# Patient Record
Sex: MALE | Race: WHITE | NOT HISPANIC OR LATINO | Employment: OTHER | URBAN - NONMETROPOLITAN AREA
[De-identification: names, ages, dates, MRNs, and addresses within clinical notes are randomized per-mention and may not be internally consistent; named-entity substitution may affect disease eponyms.]

---

## 2018-01-24 ENCOUNTER — OFFICE VISIT CONVERTED (OUTPATIENT)
Dept: OTOLARYNGOLOGY | Facility: CLINIC | Age: 68
End: 2018-01-24
Attending: OTOLARYNGOLOGY

## 2018-01-24 ENCOUNTER — CONVERSION ENCOUNTER (OUTPATIENT)
Dept: OTOLARYNGOLOGY | Facility: CLINIC | Age: 68
End: 2018-01-24

## 2018-04-18 ENCOUNTER — OFFICE VISIT CONVERTED (OUTPATIENT)
Dept: OTOLARYNGOLOGY | Facility: CLINIC | Age: 68
End: 2018-04-18
Attending: OTOLARYNGOLOGY

## 2021-04-22 ENCOUNTER — HOSPITAL ENCOUNTER (OUTPATIENT)
Dept: OTHER | Facility: HOSPITAL | Age: 71
Discharge: HOME | End: 2021-04-22
Attending: FAMILY MEDICINE

## 2021-04-22 LAB
ALBUMIN SERPL-MCNC: 4.1 G/DL (ref 3.5–5)
ALBUMIN/GLOB SERPL: 1.5 {RATIO} (ref 1.4–2.6)
ALP SERPL-CCNC: 68 U/L (ref 56–155)
ALT SERPL-CCNC: 20 U/L (ref 10–40)
ANION GAP SERPL CALC-SCNC: 17 MMOL/L (ref 8–19)
AST SERPL-CCNC: 19 U/L (ref 15–50)
BASOPHILS # BLD MANUAL: 0.05 10*3/UL (ref 0–0.2)
BASOPHILS NFR BLD MANUAL: 0.9 % (ref 0–3)
BILIRUB SERPL-MCNC: 0.57 MG/DL (ref 0.2–1.3)
BUN SERPL-MCNC: 15 MG/DL (ref 5–25)
BUN/CREAT SERPL: 13 {RATIO} (ref 6–20)
CALCIUM SERPL-MCNC: 9.3 MG/DL (ref 8.7–10.4)
CHLORIDE SERPL-SCNC: 101 MMOL/L (ref 99–111)
CHOLEST SERPL-MCNC: 162 MG/DL (ref 107–200)
CHOLEST/HDLC SERPL: 4.9 {RATIO} (ref 3–6)
CONV CO2: 26 MMOL/L (ref 22–32)
CONV CREATININE URINE, RANDOM: 252.6 MG/DL (ref 10–300)
CONV MICROALBUM.,U,RANDOM: <12 MG/L (ref 0–20)
CONV TOTAL PROTEIN: 6.9 G/DL (ref 6.3–8.2)
CREAT UR-MCNC: 1.15 MG/DL (ref 0.7–1.2)
DEPRECATED RDW RBC AUTO: 43.9 FL
EOSINOPHIL # BLD MANUAL: 0.44 10*3/UL (ref 0–0.7)
EOSINOPHIL NFR BLD MANUAL: 7.7 % (ref 0–7)
ERYTHROCYTE [DISTWIDTH] IN BLOOD BY AUTOMATED COUNT: 12.9 % (ref 11.5–14.5)
EST. AVERAGE GLUCOSE BLD GHB EST-MCNC: 148 MG/DL
GFR SERPLBLD BASED ON 1.73 SQ M-ARVRAT: >60 ML/MIN/{1.73_M2}
GLOBULIN UR ELPH-MCNC: 2.8 G/DL (ref 2–3.5)
GLUCOSE SERPL-MCNC: 174 MG/DL (ref 70–99)
GRANS (ABSOLUTE): 3.45 10*3/UL (ref 2–8)
GRANS: 60.1 % (ref 30–85)
HBA1C MFR BLD: 14.5 G/DL (ref 14–18)
HBA1C MFR BLD: 6.8 % (ref 3.5–5.7)
HCT VFR BLD AUTO: 43 % (ref 42–52)
HDLC SERPL-MCNC: 33 MG/DL (ref 40–60)
IMM GRANULOCYTES # BLD: 0.01 10*3/UL (ref 0–0.54)
IMM GRANULOCYTES NFR BLD: 0.2 % (ref 0–0.43)
LDLC SERPL CALC-MCNC: 89 MG/DL (ref 70–100)
LYMPHOCYTES # BLD MANUAL: 1.46 10*3/UL (ref 1–5)
LYMPHOCYTES NFR BLD MANUAL: 5.6 % (ref 3–10)
MCH RBC QN AUTO: 31 PG (ref 27–31)
MCHC RBC AUTO-ENTMCNC: 33.7 G/DL (ref 33–37)
MCV RBC AUTO: 92.1 FL (ref 80–96)
MICROALBUMIN/CREAT UR: 4.8 MG/G{CRE} (ref 0–25)
MONOCYTES # BLD AUTO: 0.32 10*3/UL (ref 0.2–1.2)
OSMOLALITY SERPL CALC.SUM OF ELEC: 295 MOSM/KG (ref 273–304)
PLATELET # BLD AUTO: 218 10*3/UL (ref 130–400)
PMV BLD AUTO: 10.3 FL (ref 7.4–10.4)
POTASSIUM SERPL-SCNC: 4.2 MMOL/L (ref 3.5–5.3)
PSA SERPL-MCNC: 0.78 NG/ML (ref 0–4)
RBC # BLD AUTO: 4.67 10*6/UL (ref 4.7–6.1)
SODIUM SERPL-SCNC: 140 MMOL/L (ref 135–147)
TRIGL SERPL-MCNC: 200 MG/DL (ref 40–150)
TSH SERPL-ACNC: 1.48 M[IU]/L (ref 0.27–4.2)
VARIANT LYMPHS NFR BLD MANUAL: 25.5 % (ref 20–45)
VLDLC SERPL-MCNC: 40 MG/DL (ref 5–37)
WBC # BLD AUTO: 5.73 10*3/UL (ref 4.8–10.8)

## 2021-05-16 VITALS — TEMPERATURE: 97 F | BODY MASS INDEX: 28.79 KG/M2 | HEIGHT: 68 IN | WEIGHT: 190 LBS

## 2021-05-16 VITALS — BODY MASS INDEX: 29.38 KG/M2 | HEIGHT: 69 IN | TEMPERATURE: 97 F | WEIGHT: 198.37 LBS

## 2021-09-21 ENCOUNTER — TRANSCRIBE ORDERS (OUTPATIENT)
Dept: ADMINISTRATIVE | Facility: HOSPITAL | Age: 71
End: 2021-09-21

## 2021-09-21 ENCOUNTER — LAB (OUTPATIENT)
Dept: LAB | Facility: HOSPITAL | Age: 71
End: 2021-09-21

## 2021-09-21 DIAGNOSIS — Z79.899 ENCOUNTER FOR LONG-TERM (CURRENT) USE OF OTHER MEDICATIONS: ICD-10-CM

## 2021-09-21 DIAGNOSIS — E11.9 DIABETES MELLITUS WITHOUT COMPLICATION (HCC): ICD-10-CM

## 2021-09-21 DIAGNOSIS — E11.9 DIABETES MELLITUS WITHOUT COMPLICATION (HCC): Primary | ICD-10-CM

## 2021-09-21 LAB — HBA1C MFR BLD: 6.8 % (ref 4.8–5.6)

## 2021-09-21 PROCEDURE — 36415 COLL VENOUS BLD VENIPUNCTURE: CPT

## 2021-09-21 PROCEDURE — 83036 HEMOGLOBIN GLYCOSYLATED A1C: CPT

## 2022-05-04 ENCOUNTER — TRANSCRIBE ORDERS (OUTPATIENT)
Dept: ADMINISTRATIVE | Facility: HOSPITAL | Age: 72
End: 2022-05-04

## 2022-05-04 ENCOUNTER — LAB (OUTPATIENT)
Dept: LAB | Facility: HOSPITAL | Age: 72
End: 2022-05-04

## 2022-05-04 DIAGNOSIS — K21.9 GERD WITHOUT ESOPHAGITIS: ICD-10-CM

## 2022-05-04 DIAGNOSIS — E55.9 VITAMIN D DEFICIENCY: ICD-10-CM

## 2022-05-04 DIAGNOSIS — Z79.899 ENCOUNTER FOR LONG-TERM (CURRENT) USE OF OTHER MEDICATIONS: ICD-10-CM

## 2022-05-04 DIAGNOSIS — R53.83 TIREDNESS: ICD-10-CM

## 2022-05-04 DIAGNOSIS — E11.9 DIABETES MELLITUS WITHOUT COMPLICATION: ICD-10-CM

## 2022-05-04 DIAGNOSIS — E78.5 HYPERLIPIDEMIA, UNSPECIFIED HYPERLIPIDEMIA TYPE: ICD-10-CM

## 2022-05-04 DIAGNOSIS — E78.5 HYPERLIPIDEMIA, UNSPECIFIED HYPERLIPIDEMIA TYPE: Primary | ICD-10-CM

## 2022-05-04 LAB
25(OH)D3 SERPL-MCNC: 71.6 NG/ML (ref 30–100)
ALBUMIN SERPL-MCNC: 4.5 G/DL (ref 3.5–5.2)
ALBUMIN/GLOB SERPL: 1.7 G/DL
ALP SERPL-CCNC: 80 U/L (ref 39–117)
ALT SERPL W P-5'-P-CCNC: 17 U/L (ref 1–41)
ANION GAP SERPL CALCULATED.3IONS-SCNC: 8.2 MMOL/L (ref 5–15)
AST SERPL-CCNC: 19 U/L (ref 1–40)
BASOPHILS # BLD AUTO: 0.05 10*3/MM3 (ref 0–0.2)
BASOPHILS NFR BLD AUTO: 0.8 % (ref 0–1.5)
BILIRUB SERPL-MCNC: 0.4 MG/DL (ref 0–1.2)
BUN SERPL-MCNC: 11 MG/DL (ref 8–23)
BUN/CREAT SERPL: 9.6 (ref 7–25)
CALCIUM SPEC-SCNC: 9.6 MG/DL (ref 8.6–10.5)
CHLORIDE SERPL-SCNC: 103 MMOL/L (ref 98–107)
CHOLEST SERPL-MCNC: 179 MG/DL (ref 0–200)
CO2 SERPL-SCNC: 28.8 MMOL/L (ref 22–29)
CREAT SERPL-MCNC: 1.14 MG/DL (ref 0.76–1.27)
DEPRECATED RDW RBC AUTO: 43.7 FL (ref 37–54)
EGFRCR SERPLBLD CKD-EPI 2021: 68.8 ML/MIN/1.73
EOSINOPHIL # BLD AUTO: 0.43 10*3/MM3 (ref 0–0.4)
EOSINOPHIL NFR BLD AUTO: 6.9 % (ref 0.3–6.2)
ERYTHROCYTE [DISTWIDTH] IN BLOOD BY AUTOMATED COUNT: 12.7 % (ref 12.3–15.4)
FOLATE SERPL-MCNC: 20 NG/ML (ref 4.78–24.2)
GLOBULIN UR ELPH-MCNC: 2.6 GM/DL
GLUCOSE SERPL-MCNC: 141 MG/DL (ref 65–99)
HBA1C MFR BLD: 6.8 % (ref 4.8–5.6)
HCT VFR BLD AUTO: 43.9 % (ref 37.5–51)
HDLC SERPL-MCNC: 31 MG/DL (ref 40–60)
HGB BLD-MCNC: 14.5 G/DL (ref 13–17.7)
IMM GRANULOCYTES # BLD AUTO: 0.01 10*3/MM3 (ref 0–0.05)
IMM GRANULOCYTES NFR BLD AUTO: 0.2 % (ref 0–0.5)
LDLC SERPL CALC-MCNC: 109 MG/DL (ref 0–100)
LDLC/HDLC SERPL: 3.34 {RATIO}
LYMPHOCYTES # BLD AUTO: 1.57 10*3/MM3 (ref 0.7–3.1)
LYMPHOCYTES NFR BLD AUTO: 25.2 % (ref 19.6–45.3)
MCH RBC QN AUTO: 30.7 PG (ref 26.6–33)
MCHC RBC AUTO-ENTMCNC: 33 G/DL (ref 31.5–35.7)
MCV RBC AUTO: 93 FL (ref 79–97)
MONOCYTES # BLD AUTO: 0.42 10*3/MM3 (ref 0.1–0.9)
MONOCYTES NFR BLD AUTO: 6.8 % (ref 5–12)
NEUTROPHILS NFR BLD AUTO: 3.74 10*3/MM3 (ref 1.7–7)
NEUTROPHILS NFR BLD AUTO: 60.1 % (ref 42.7–76)
PLATELET # BLD AUTO: 228 10*3/MM3 (ref 140–450)
PMV BLD AUTO: 10.4 FL (ref 6–12)
POTASSIUM SERPL-SCNC: 4.7 MMOL/L (ref 3.5–5.2)
PROT SERPL-MCNC: 7.1 G/DL (ref 6–8.5)
RBC # BLD AUTO: 4.72 10*6/MM3 (ref 4.14–5.8)
SODIUM SERPL-SCNC: 140 MMOL/L (ref 136–145)
TRIGL SERPL-MCNC: 222 MG/DL (ref 0–150)
TSH SERPL DL<=0.05 MIU/L-ACNC: 1.71 UIU/ML (ref 0.27–4.2)
VIT B12 BLD-MCNC: >2000 PG/ML (ref 211–946)
VLDLC SERPL-MCNC: 39 MG/DL (ref 5–40)
WBC NRBC COR # BLD: 6.22 10*3/MM3 (ref 3.4–10.8)

## 2022-05-04 PROCEDURE — 82607 VITAMIN B-12: CPT

## 2022-05-04 PROCEDURE — 83036 HEMOGLOBIN GLYCOSYLATED A1C: CPT

## 2022-05-04 PROCEDURE — 80061 LIPID PANEL: CPT

## 2022-05-04 PROCEDURE — 82746 ASSAY OF FOLIC ACID SERUM: CPT

## 2022-05-04 PROCEDURE — 84146 ASSAY OF PROLACTIN: CPT

## 2022-05-04 PROCEDURE — 36415 COLL VENOUS BLD VENIPUNCTURE: CPT

## 2022-05-04 PROCEDURE — 84443 ASSAY THYROID STIM HORMONE: CPT

## 2022-05-04 PROCEDURE — 80053 COMPREHEN METABOLIC PANEL: CPT

## 2022-05-04 PROCEDURE — 85025 COMPLETE CBC W/AUTO DIFF WBC: CPT

## 2022-05-04 PROCEDURE — 82306 VITAMIN D 25 HYDROXY: CPT

## 2022-05-12 LAB
MACROPROLACTIN/PROLACTIN MFR SERPL: 26 %
PROLACTIN SERPL-MCNC: 7.19 NG/ML
PROLACTIN.MONOMERIC SERPL-MCNC: 5.32 NG/ML

## 2022-05-13 ENCOUNTER — OFFICE VISIT (OUTPATIENT)
Dept: CARDIOLOGY | Facility: CLINIC | Age: 72
End: 2022-05-13

## 2022-05-13 ENCOUNTER — TRANSCRIBE ORDERS (OUTPATIENT)
Dept: CARDIOLOGY | Facility: CLINIC | Age: 72
End: 2022-05-13

## 2022-05-13 VITALS
WEIGHT: 193.4 LBS | HEART RATE: 64 BPM | DIASTOLIC BLOOD PRESSURE: 88 MMHG | OXYGEN SATURATION: 98 % | HEIGHT: 68 IN | SYSTOLIC BLOOD PRESSURE: 158 MMHG | BODY MASS INDEX: 29.31 KG/M2

## 2022-05-13 DIAGNOSIS — I10 PRIMARY HYPERTENSION: ICD-10-CM

## 2022-05-13 DIAGNOSIS — I20.0 UNSTABLE ANGINA: Primary | ICD-10-CM

## 2022-05-13 DIAGNOSIS — G47.33 OSA ON CPAP: ICD-10-CM

## 2022-05-13 DIAGNOSIS — Z01.818 OTHER SPECIFIED PRE-OPERATIVE EXAMINATION: ICD-10-CM

## 2022-05-13 DIAGNOSIS — Z13.6 SCREENING FOR ISCHEMIC HEART DISEASE: ICD-10-CM

## 2022-05-13 DIAGNOSIS — Z01.810 PRE-OPERATIVE CARDIOVASCULAR EXAMINATION: Primary | ICD-10-CM

## 2022-05-13 DIAGNOSIS — Z99.89 OSA ON CPAP: ICD-10-CM

## 2022-05-13 DIAGNOSIS — E11.69 TYPE 2 DIABETES MELLITUS WITH OTHER SPECIFIED COMPLICATION, WITHOUT LONG-TERM CURRENT USE OF INSULIN: ICD-10-CM

## 2022-05-13 PROBLEM — E11.9 DIABETES MELLITUS: Status: ACTIVE | Noted: 2022-05-13

## 2022-05-13 PROBLEM — R07.2 PRECORDIAL PAIN: Status: ACTIVE | Noted: 2022-05-13

## 2022-05-13 PROCEDURE — 99204 OFFICE O/P NEW MOD 45 MIN: CPT | Performed by: INTERNAL MEDICINE

## 2022-05-13 PROCEDURE — 93000 ELECTROCARDIOGRAM COMPLETE: CPT | Performed by: INTERNAL MEDICINE

## 2022-05-13 RX ORDER — ALPRAZOLAM 1 MG/1
0.5 TABLET ORAL
COMMUNITY

## 2022-05-13 RX ORDER — FOLIC ACID/MULTIVIT,IRON,MINER .4-18-35
1 TABLET,CHEWABLE ORAL DAILY
COMMUNITY

## 2022-05-13 RX ORDER — LISINOPRIL 10 MG/1
TABLET ORAL
COMMUNITY
Start: 2022-02-28

## 2022-05-13 RX ORDER — OMEPRAZOLE 10 MG/1
10 CAPSULE, DELAYED RELEASE ORAL DAILY
COMMUNITY
End: 2022-05-19 | Stop reason: HOSPADM

## 2022-05-13 RX ORDER — BIOTIN 10000 MCG
CAPSULE ORAL
COMMUNITY

## 2022-05-13 RX ORDER — UBIDECARENONE 100 MG
100 CAPSULE ORAL DAILY
COMMUNITY

## 2022-05-13 NOTE — H&P (VIEW-ONLY)
Subjective:     Encounter Date:05/13/2022      Patient ID: Nhan James is a 71 y.o. male.    Chief Complaint:  History of Present Illness    This is a 71-year-old with borderline hypertension, acid reflux, who presents for evaluation of chest pain.    I saw the patient in the past and 10/2015 for complaints of near syncope.  He had undergone an echocardiogram prior to that office visit that was unremarkable.  I recommended proceeding with a stress test because of symptoms as diaphoresis associated with his near-syncope.  It does not appear that the stress test was ever completed.    The patient reports that he was in his usual state of health until earlier this week.  About 4 days ago he reports he was going on a as usual walk when he developed sudden onset of midsternal chest pain that radiated across his chest to his bilateral upper arms and was associated with bilateral arm numbness.  He denies any associated nausea, vomiting, diaphoresis, or shortness of breath.  As soon as the symptoms started he turned right back around and went back to his house.  Once he got home he took a aspirin and sat down.  After resting for a few minutes the symptoms resolved.  He has had no recurrence since then.  He has not tried going on his walks again but did work on replacing his daughter's windows at her house the other day.  He reports this pretty intense work that involves building up a sweat and raising his heart rate.  He denies any symptoms with this activity.  Interestingly on the day that he had his episode of chest pain he had gone and played golf and had mowed his lawn without any significant issues.    He otherwise denies any dyspnea on exertion, palpitations, orthopnea, near-syncope or syncope, or lower extremity edema.  He takes lisinopril only as needed for systolic pressures greater than 140.  He reports he is only had to take it about 3 times over the last 6 months.    He does have a significant  family history of coronary artery disease including his father who had his first myocardial infarction at the age of 53 and his brother who had a CABG at the age of 52.    Review of Systems   Constitutional: Negative for malaise/fatigue.   HENT: Negative for hearing loss, hoarse voice, nosebleeds and sore throat.    Eyes: Negative for pain.   Cardiovascular: Positive for chest pain. Negative for claudication, cyanosis, dyspnea on exertion, irregular heartbeat, leg swelling, near-syncope, orthopnea, palpitations, paroxysmal nocturnal dyspnea and syncope.   Respiratory: Negative for shortness of breath and snoring.    Endocrine: Negative for cold intolerance, heat intolerance, polydipsia, polyphagia and polyuria.   Skin: Negative for itching and rash.   Musculoskeletal: Negative for arthritis, falls, joint pain, joint swelling, muscle cramps, muscle weakness and myalgias.   Gastrointestinal: Negative for constipation, diarrhea, dysphagia, heartburn, hematemesis, hematochezia, melena, nausea and vomiting.   Genitourinary: Negative for frequency, hematuria and hesitancy.   Neurological: Negative for excessive daytime sleepiness, dizziness, headaches, light-headedness, numbness and weakness.   Psychiatric/Behavioral: Negative for depression. The patient is not nervous/anxious.          Current Outpatient Medications:   •  ALPRAZolam (XANAX) 1 MG tablet, Take 0.5 tablets by mouth every night at bedtime., Disp: , Rfl:   •  Barberry-Oreg Grape-Goldenseal (BERBERINE COMPLEX PO), Take  by mouth., Disp: , Rfl:   •  Biotin 10 MG capsule, Take  by mouth., Disp: , Rfl:   •  coenzyme Q10 100 MG capsule, Take 100 mg by mouth Daily., Disp: , Rfl:   •  L-ARGININE PO, Take  by mouth., Disp: , Rfl:   •  lisinopril (PRINIVIL,ZESTRIL) 10 MG tablet, TAKE 1/2 TO 1 (ONE-HALF TO ONE) TABLET BY MOUTH ONCE DAILY FOR BLOOD PRESSURE GREATER THAN 140, Disp: , Rfl:   •  Magnesium Gluconate (MAGNESIUM 27 PO), Take  by mouth., Disp: , Rfl:   •   "multivitamin-iron-minerals-folic acid (CENTRUM) chewable tablet, Chew 1 tablet Daily., Disp: , Rfl:   •  NON FORMULARY, Tri-chromium, Disp: , Rfl:   •  Omega-3 Fatty Acids (OMEGA-3 2100 PO), Take  by mouth., Disp: , Rfl:   •  omeprazole (prilOSEC) 10 MG capsule, Take 10 mg by mouth Daily., Disp: , Rfl:   •  ZINC PICOLINATE PO, Take  by mouth., Disp: , Rfl:     Past Medical History:   Diagnosis Date   • History of hepatitis    • IVAN (obstructive sleep apnea)        Past Surgical History:   Procedure Laterality Date   • APPENDECTOMY     • CHOLECYSTECTOMY     • FINGER SURGERY     • TONSILLECTOMY AND ADENOIDECTOMY         Family History   Problem Relation Age of Onset   • Heart disease Mother    • Diabetes Mother    • Hypertension Father    • Heart disease Father    • Diabetes Brother    • Hypertension Brother    • Heart disease Brother    • Hypertension Brother    • Heart disease Brother    • Heart disease Brother    • Cerebral aneurysm Paternal Aunt    • Cancer Paternal Aunt    • Cancer Paternal Uncle    • Diabetes Maternal Grandmother    • Hypertension Maternal Grandfather    • Stroke Maternal Grandfather        Social History     Tobacco Use   • Smoking status: Former Smoker     Quit date:      Years since quittin.3   • Smokeless tobacco: Never Used   • Tobacco comment: caffeine use   Vaping Use   • Vaping Use: Never used   Substance Use Topics   • Alcohol use: Yes     Comment: occ   • Drug use: Never         ECG 12 Lead    Date/Time: 2022 4:11 PM  Performed by: Marilyn Gray MD  Authorized by: Marilyn Gray MD   Comparison: compared with previous ECG   Similar to previous ECG  Rhythm: sinus rhythm               Objective:     Visit Vitals  /88 (BP Location: Left arm, Patient Position: Sitting, Cuff Size: Adult)   Pulse 64   Ht 172.7 cm (68\")   Wt 87.7 kg (193 lb 6.4 oz)   SpO2 98%   BMI 29.41 kg/m²         Constitutional:       Appearance: Normal appearance. Well-developed.   HENT:      " Head: Normocephalic and atraumatic.   Neck:      Vascular: No carotid bruit or JVD.   Pulmonary:      Effort: Pulmonary effort is normal.      Breath sounds: Normal breath sounds.   Cardiovascular:      Normal rate. Regular rhythm.      No gallop.   Pulses:     Radial: 2+ bilaterally.  Edema:     Peripheral edema absent.   Abdominal:      Palpations: Abdomen is soft.   Skin:     General: Skin is warm and dry.   Neurological:      Mental Status: Alert and oriented to person, place, and time.             Assessment:          Diagnosis Plan   1. Unstable angina (HCC)  Case Request Cath Lab: Coronary angiography, Left heart cath, Left ventriculography   2. Primary hypertension  Case Request Cath Lab: Coronary angiography, Left heart cath, Left ventriculography   3. IVAN on CPAP     4. Type 2 diabetes mellitus with other specified complication, without long-term current use of insulin (HCC)  Case Request Cath Lab: Coronary angiography, Left heart cath, Left ventriculography          Plan:       1.  Unstable angina.  Episode of chest pain that occurred with exertion and resolved with rest is concerning for unstable angina.  Fortunately his EKG is unchanged today.  Fortunately he is also not had any further episodes of pain.  Primary risk factor appears to be significant family history of premature coronary artery disease.  In addition there appears to be some mild hypertension that is not currently treated.  It also appears that his most recent lipid panel earlier this month showed that his lipids were not ideally controlled with an LDL of 109, HDL of 31, and triglycerides of 222.  He also reports history of borderline diabetes.  Based on his risk factors and is highly suspicious symptoms I discussed the options of proceeding with stress test versus cardiac catheterization.  I think proceeding directly with a cardiac catheterization is indicated in light of his highly suspicious symptoms.  The patient would like to proceed  with cardiac catheterization.  I did go over the procedure, risk, benefits at length with the patient.  2.  Hypertension.  Does not take any antihypertensive medications on a regular basis.  His blood pressures are mildly elevated in the office today but he indicates that is not normally this elevated.  3.  Hyperlipidemia.  4.  Diabetes mellitus type 2  5.  Obstructive sleep apnea.    Further recommendations will be based on the results of his cardiac catheterization.

## 2022-05-17 ENCOUNTER — APPOINTMENT (OUTPATIENT)
Dept: LAB | Facility: HOSPITAL | Age: 72
End: 2022-05-17

## 2022-05-17 ENCOUNTER — LAB (OUTPATIENT)
Dept: LAB | Facility: HOSPITAL | Age: 72
End: 2022-05-17

## 2022-05-17 ENCOUNTER — TRANSCRIBE ORDERS (OUTPATIENT)
Dept: ADMINISTRATIVE | Facility: HOSPITAL | Age: 72
End: 2022-05-17

## 2022-05-17 DIAGNOSIS — Z01.810 PRE-OPERATIVE CARDIOVASCULAR EXAMINATION: ICD-10-CM

## 2022-05-17 DIAGNOSIS — Z13.6 SCREENING FOR ISCHEMIC HEART DISEASE: ICD-10-CM

## 2022-05-17 DIAGNOSIS — Z01.818 OTHER SPECIFIED PRE-OPERATIVE EXAMINATION: Primary | ICD-10-CM

## 2022-05-17 DIAGNOSIS — Z01.818 OTHER SPECIFIED PRE-OPERATIVE EXAMINATION: ICD-10-CM

## 2022-05-17 LAB
ANION GAP SERPL CALCULATED.3IONS-SCNC: 11.1 MMOL/L (ref 5–15)
BASOPHILS # BLD AUTO: 0.05 10*3/MM3 (ref 0–0.2)
BASOPHILS NFR BLD AUTO: 0.9 % (ref 0–1.5)
BUN SERPL-MCNC: 16 MG/DL (ref 8–23)
BUN/CREAT SERPL: 14.5 (ref 7–25)
CALCIUM SPEC-SCNC: 9.5 MG/DL (ref 8.6–10.5)
CHLORIDE SERPL-SCNC: 98 MMOL/L (ref 98–107)
CO2 SERPL-SCNC: 27.9 MMOL/L (ref 22–29)
CREAT SERPL-MCNC: 1.1 MG/DL (ref 0.76–1.27)
DEPRECATED RDW RBC AUTO: 42 FL (ref 37–54)
EGFRCR SERPLBLD CKD-EPI 2021: 71.8 ML/MIN/1.73
EOSINOPHIL # BLD AUTO: 0.35 10*3/MM3 (ref 0–0.4)
EOSINOPHIL NFR BLD AUTO: 6 % (ref 0.3–6.2)
ERYTHROCYTE [DISTWIDTH] IN BLOOD BY AUTOMATED COUNT: 12.3 % (ref 12.3–15.4)
GLUCOSE SERPL-MCNC: 280 MG/DL (ref 65–99)
HCT VFR BLD AUTO: 44.9 % (ref 37.5–51)
HGB BLD-MCNC: 14.8 G/DL (ref 13–17.7)
IMM GRANULOCYTES # BLD AUTO: 0.02 10*3/MM3 (ref 0–0.05)
IMM GRANULOCYTES NFR BLD AUTO: 0.3 % (ref 0–0.5)
LYMPHOCYTES # BLD AUTO: 1.37 10*3/MM3 (ref 0.7–3.1)
LYMPHOCYTES NFR BLD AUTO: 23.7 % (ref 19.6–45.3)
MCH RBC QN AUTO: 30.8 PG (ref 26.6–33)
MCHC RBC AUTO-ENTMCNC: 33 G/DL (ref 31.5–35.7)
MCV RBC AUTO: 93.3 FL (ref 79–97)
MONOCYTES # BLD AUTO: 0.37 10*3/MM3 (ref 0.1–0.9)
MONOCYTES NFR BLD AUTO: 6.4 % (ref 5–12)
NEUTROPHILS NFR BLD AUTO: 3.63 10*3/MM3 (ref 1.7–7)
NEUTROPHILS NFR BLD AUTO: 62.7 % (ref 42.7–76)
NRBC BLD AUTO-RTO: 0 /100 WBC (ref 0–0.2)
PLATELET # BLD AUTO: 214 10*3/MM3 (ref 140–450)
PMV BLD AUTO: 10.4 FL (ref 6–12)
POTASSIUM SERPL-SCNC: 4.3 MMOL/L (ref 3.5–5.2)
RBC # BLD AUTO: 4.81 10*6/MM3 (ref 4.14–5.8)
SARS-COV-2 ORF1AB RESP QL NAA+PROBE: NOT DETECTED
SODIUM SERPL-SCNC: 137 MMOL/L (ref 136–145)
WBC NRBC COR # BLD: 5.79 10*3/MM3 (ref 3.4–10.8)

## 2022-05-17 PROCEDURE — 80048 BASIC METABOLIC PNL TOTAL CA: CPT

## 2022-05-17 PROCEDURE — U0004 COV-19 TEST NON-CDC HGH THRU: HCPCS | Performed by: INTERNAL MEDICINE

## 2022-05-17 PROCEDURE — 85025 COMPLETE CBC W/AUTO DIFF WBC: CPT

## 2022-05-17 PROCEDURE — U0005 INFEC AGEN DETEC AMPLI PROBE: HCPCS | Performed by: INTERNAL MEDICINE

## 2022-05-17 PROCEDURE — 36415 COLL VENOUS BLD VENIPUNCTURE: CPT

## 2022-05-18 ENCOUNTER — HOSPITAL ENCOUNTER (OUTPATIENT)
Facility: HOSPITAL | Age: 72
Discharge: HOME | End: 2022-05-19
Attending: INTERNAL MEDICINE | Admitting: INTERNAL MEDICINE

## 2022-05-18 DIAGNOSIS — E11.69 TYPE 2 DIABETES MELLITUS WITH OTHER SPECIFIED COMPLICATION, WITHOUT LONG-TERM CURRENT USE OF INSULIN: ICD-10-CM

## 2022-05-18 DIAGNOSIS — I25.700 CORONARY ARTERY DISEASE INVOLVING CORONARY BYPASS GRAFT OF NATIVE HEART WITH UNSTABLE ANGINA PECTORIS: Primary | ICD-10-CM

## 2022-05-18 DIAGNOSIS — I10 PRIMARY HYPERTENSION: ICD-10-CM

## 2022-05-18 DIAGNOSIS — I20.0 UNSTABLE ANGINA: ICD-10-CM

## 2022-05-18 LAB
GLUCOSE BLDC GLUCOMTR-MCNC: 138 MG/DL (ref 70–130)
GLUCOSE BLDC GLUCOMTR-MCNC: 147 MG/DL (ref 70–130)

## 2022-05-18 PROCEDURE — C1769 GUIDE WIRE: HCPCS | Performed by: INTERNAL MEDICINE

## 2022-05-18 PROCEDURE — 25010000002 MIDAZOLAM PER 1 MG: Performed by: INTERNAL MEDICINE

## 2022-05-18 PROCEDURE — C1874 STENT, COATED/COV W/DEL SYS: HCPCS | Performed by: INTERNAL MEDICINE

## 2022-05-18 PROCEDURE — 99153 MOD SED SAME PHYS/QHP EA: CPT | Performed by: INTERNAL MEDICINE

## 2022-05-18 PROCEDURE — G0378 HOSPITAL OBSERVATION PER HR: HCPCS

## 2022-05-18 PROCEDURE — 93458 L HRT ARTERY/VENTRICLE ANGIO: CPT | Performed by: INTERNAL MEDICINE

## 2022-05-18 PROCEDURE — C9600 PERC DRUG-EL COR STENT SING: HCPCS | Performed by: INTERNAL MEDICINE

## 2022-05-18 PROCEDURE — 25010000002 HEPARIN (PORCINE) PER 1000 UNITS: Performed by: INTERNAL MEDICINE

## 2022-05-18 PROCEDURE — 99152 MOD SED SAME PHYS/QHP 5/>YRS: CPT | Performed by: INTERNAL MEDICINE

## 2022-05-18 PROCEDURE — C1894 INTRO/SHEATH, NON-LASER: HCPCS | Performed by: INTERNAL MEDICINE

## 2022-05-18 PROCEDURE — C1887 CATHETER, GUIDING: HCPCS | Performed by: INTERNAL MEDICINE

## 2022-05-18 PROCEDURE — 25010000002 HYDRALAZINE PER 20 MG: Performed by: INTERNAL MEDICINE

## 2022-05-18 PROCEDURE — 63710000001 PANTOPRAZOLE 40 MG TABLET DELAYED-RELEASE: Performed by: INTERNAL MEDICINE

## 2022-05-18 PROCEDURE — 93005 ELECTROCARDIOGRAM TRACING: CPT | Performed by: INTERNAL MEDICINE

## 2022-05-18 PROCEDURE — 85347 COAGULATION TIME ACTIVATED: CPT

## 2022-05-18 PROCEDURE — A9270 NON-COVERED ITEM OR SERVICE: HCPCS | Performed by: INTERNAL MEDICINE

## 2022-05-18 PROCEDURE — 25010000002 FENTANYL CITRATE (PF) 50 MCG/ML SOLUTION: Performed by: INTERNAL MEDICINE

## 2022-05-18 PROCEDURE — 82962 GLUCOSE BLOOD TEST: CPT

## 2022-05-18 PROCEDURE — 63710000001 METOPROLOL SUCCINATE XL 25 MG TABLET SUSTAINED-RELEASE 24 HOUR: Performed by: INTERNAL MEDICINE

## 2022-05-18 PROCEDURE — C1725 CATH, TRANSLUMIN NON-LASER: HCPCS | Performed by: INTERNAL MEDICINE

## 2022-05-18 PROCEDURE — 0 IOPAMIDOL PER 1 ML: Performed by: INTERNAL MEDICINE

## 2022-05-18 PROCEDURE — 92928 PRQ TCAT PLMT NTRAC ST 1 LES: CPT | Performed by: INTERNAL MEDICINE

## 2022-05-18 DEVICE — XIENCE SKYPOINT™ EVEROLIMUS ELUTING CORONARY STENT SYSTEM 3.00 MM X 38 MM / RAPID-EXCHANGE
Type: IMPLANTABLE DEVICE | Status: FUNCTIONAL
Brand: XIENCE SKYPOINT™

## 2022-05-18 RX ORDER — METOPROLOL SUCCINATE 25 MG/1
25 TABLET, EXTENDED RELEASE ORAL
Status: DISCONTINUED | OUTPATIENT
Start: 2022-05-18 | End: 2022-05-19 | Stop reason: HOSPADM

## 2022-05-18 RX ORDER — ONDANSETRON 4 MG/1
4 TABLET, FILM COATED ORAL EVERY 6 HOURS PRN
Status: DISCONTINUED | OUTPATIENT
Start: 2022-05-18 | End: 2022-05-19 | Stop reason: HOSPADM

## 2022-05-18 RX ORDER — SODIUM CHLORIDE 9 MG/ML
75 INJECTION, SOLUTION INTRAVENOUS CONTINUOUS
Status: DISCONTINUED | OUTPATIENT
Start: 2022-05-18 | End: 2022-05-18

## 2022-05-18 RX ORDER — SODIUM CHLORIDE 0.9 % (FLUSH) 0.9 %
10 SYRINGE (ML) INJECTION EVERY 12 HOURS SCHEDULED
Status: DISCONTINUED | OUTPATIENT
Start: 2022-05-18 | End: 2022-05-18

## 2022-05-18 RX ORDER — CLOPIDOGREL BISULFATE 75 MG/1
75 TABLET ORAL DAILY
Status: DISCONTINUED | OUTPATIENT
Start: 2022-05-19 | End: 2022-05-19 | Stop reason: HOSPADM

## 2022-05-18 RX ORDER — HEPARIN SODIUM 1000 [USP'U]/ML
INJECTION, SOLUTION INTRAVENOUS; SUBCUTANEOUS AS NEEDED
Status: DISCONTINUED | OUTPATIENT
Start: 2022-05-18 | End: 2022-05-18 | Stop reason: HOSPADM

## 2022-05-18 RX ORDER — MIDAZOLAM HYDROCHLORIDE 1 MG/ML
INJECTION INTRAMUSCULAR; INTRAVENOUS AS NEEDED
Status: DISCONTINUED | OUTPATIENT
Start: 2022-05-18 | End: 2022-05-18 | Stop reason: HOSPADM

## 2022-05-18 RX ORDER — ASPIRIN 81 MG/1
TABLET, CHEWABLE ORAL AS NEEDED
Status: DISCONTINUED | OUTPATIENT
Start: 2022-05-18 | End: 2022-05-18 | Stop reason: HOSPADM

## 2022-05-18 RX ORDER — SODIUM CHLORIDE 0.9 % (FLUSH) 0.9 %
10 SYRINGE (ML) INJECTION AS NEEDED
Status: DISCONTINUED | OUTPATIENT
Start: 2022-05-18 | End: 2022-05-18

## 2022-05-18 RX ORDER — CLOPIDOGREL BISULFATE 75 MG/1
TABLET ORAL AS NEEDED
Status: DISCONTINUED | OUTPATIENT
Start: 2022-05-18 | End: 2022-05-18 | Stop reason: HOSPADM

## 2022-05-18 RX ORDER — MORPHINE SULFATE 2 MG/ML
1 INJECTION, SOLUTION INTRAMUSCULAR; INTRAVENOUS EVERY 4 HOURS PRN
Status: DISCONTINUED | OUTPATIENT
Start: 2022-05-18 | End: 2022-05-19 | Stop reason: HOSPADM

## 2022-05-18 RX ORDER — LIDOCAINE HYDROCHLORIDE 20 MG/ML
INJECTION, SOLUTION INFILTRATION; PERINEURAL AS NEEDED
Status: DISCONTINUED | OUTPATIENT
Start: 2022-05-18 | End: 2022-05-18 | Stop reason: HOSPADM

## 2022-05-18 RX ORDER — ACETAMINOPHEN 325 MG/1
650 TABLET ORAL EVERY 4 HOURS PRN
Status: DISCONTINUED | OUTPATIENT
Start: 2022-05-18 | End: 2022-05-19 | Stop reason: HOSPADM

## 2022-05-18 RX ORDER — ALUMINA, MAGNESIA, AND SIMETHICONE 2400; 2400; 240 MG/30ML; MG/30ML; MG/30ML
15 SUSPENSION ORAL EVERY 6 HOURS PRN
Status: DISCONTINUED | OUTPATIENT
Start: 2022-05-18 | End: 2022-05-19 | Stop reason: HOSPADM

## 2022-05-18 RX ORDER — PANTOPRAZOLE SODIUM 40 MG/1
40 TABLET, DELAYED RELEASE ORAL EVERY MORNING
Status: DISCONTINUED | OUTPATIENT
Start: 2022-05-18 | End: 2022-05-19 | Stop reason: HOSPADM

## 2022-05-18 RX ORDER — NALOXONE HCL 0.4 MG/ML
0.4 VIAL (ML) INJECTION
Status: DISCONTINUED | OUTPATIENT
Start: 2022-05-18 | End: 2022-05-19 | Stop reason: HOSPADM

## 2022-05-18 RX ORDER — HYDROCODONE BITARTRATE AND ACETAMINOPHEN 5; 325 MG/1; MG/1
1 TABLET ORAL EVERY 4 HOURS PRN
Status: DISCONTINUED | OUTPATIENT
Start: 2022-05-18 | End: 2022-05-19 | Stop reason: HOSPADM

## 2022-05-18 RX ORDER — ASPIRIN 81 MG/1
81 TABLET, CHEWABLE ORAL DAILY
Status: DISCONTINUED | OUTPATIENT
Start: 2022-05-19 | End: 2022-05-19 | Stop reason: HOSPADM

## 2022-05-18 RX ORDER — ONDANSETRON 2 MG/ML
4 INJECTION INTRAMUSCULAR; INTRAVENOUS EVERY 6 HOURS PRN
Status: DISCONTINUED | OUTPATIENT
Start: 2022-05-18 | End: 2022-05-19 | Stop reason: HOSPADM

## 2022-05-18 RX ORDER — ALPRAZOLAM 0.5 MG/1
0.5 TABLET ORAL NIGHTLY PRN
Status: DISCONTINUED | OUTPATIENT
Start: 2022-05-18 | End: 2022-05-19 | Stop reason: HOSPADM

## 2022-05-18 RX ORDER — SODIUM CHLORIDE 9 MG/ML
100 INJECTION, SOLUTION INTRAVENOUS CONTINUOUS
Status: ACTIVE | OUTPATIENT
Start: 2022-05-18 | End: 2022-05-18

## 2022-05-18 RX ORDER — HYDRALAZINE HYDROCHLORIDE 20 MG/ML
INJECTION INTRAMUSCULAR; INTRAVENOUS AS NEEDED
Status: DISCONTINUED | OUTPATIENT
Start: 2022-05-18 | End: 2022-05-18 | Stop reason: HOSPADM

## 2022-05-18 RX ORDER — FENTANYL CITRATE 50 UG/ML
INJECTION, SOLUTION INTRAMUSCULAR; INTRAVENOUS AS NEEDED
Status: DISCONTINUED | OUTPATIENT
Start: 2022-05-18 | End: 2022-05-18 | Stop reason: HOSPADM

## 2022-05-18 RX ADMIN — SODIUM CHLORIDE 100 ML/HR: 9 INJECTION, SOLUTION INTRAVENOUS at 17:54

## 2022-05-18 RX ADMIN — SODIUM CHLORIDE 75 ML/HR: 9 INJECTION, SOLUTION INTRAVENOUS at 08:51

## 2022-05-18 RX ADMIN — METOPROLOL SUCCINATE 25 MG: 25 TABLET, EXTENDED RELEASE ORAL at 18:00

## 2022-05-18 RX ADMIN — PANTOPRAZOLE SODIUM 40 MG: 40 TABLET, DELAYED RELEASE ORAL at 13:02

## 2022-05-18 NOTE — INTERVAL H&P NOTE
H&P updated. The patient was examined and the following changes are noted:  since his office visit he had an additional episode of chest pain when he walked in from the parking lot to the hospital to get his lab work.  The pain resolved once he stopped and sat down.  Pain was a little milder than prior episode in that it did not involve his arms.  He admits that his activity yesterday was more than what he has done in the last week since his office visit. Plan for coronary angiograms today.

## 2022-05-18 NOTE — DISCHARGE INSTRUCTIONS
[facility]  [address] [place] [address]    Coronary Angioplasty with or without  Stent (Radial Approach) After Care    Refer to this sheet in the next few weeks. These instructions provide you with information on caring for yourself after your procedure. Your health care provider may also give you more specific instructions. Your treatment has been planned according to current medical practices, but problems sometimes occur. Call your health care provider if you have any problems or questions after your procedure.       Home Care Instructions:  You may shower the day after the procedure. Remove the bandage (dressing) and gently wash the site with plain soap and water. Gently pat the site dry. You may apply a band aid daily for 2 days if desired.    Do not apply powder or lotion to the site.  Do not submerge the affected site in water for 3 to 5 days or until the site is completely healed.   Do not flex or bend at the wrist with affected arm for 24 hours.  Do not lift, push or pull anything over 5 pounds for 5 days after your procedure or as directed by your physician.  For a reference, a gallon of milk weighs 8 pounds.    Do not operate machinery or power tools for 24 hours.  Inspect the site at least twice daily. You may notice some bruising at the site and it may be tender for 1 to 2 weeks.     Increase your fluid intake for the next 2 days.    Keep arm elevated for 24 hours.  For the remainder of the day, keep your arm in the “Pledge of Allegiance” position when up and about.    Limit your activity for the next 48 hours and avoid strenuous activity as directed by your physician.   Cardiac Rehab may or may not be ordered.  Please consult with your physician  You may drive 24 hours after the procedure unless otherwise instructed by your caregiver.  A responsible adult should be with you for the first 24 hours after you arrive home.   Do not make any important legal decisions or sign legal  papers for 24 hours. Do not drink alcohol for 24 hours.    Take medicines only as directed by your health care provider.  Blood thinners may be prescribed after your procedure to improve blood flow through the stent.    Metformin or any medications containing Metformin should not be taken for 48 hours after your procedure.    Eat a heart-healthy diet. This should include plenty of fresh fruits and vegetables. Meat should be lean cuts. Avoid the following types of food:    Food that is high in salt.    Canned or highly processed food.    Food that is high in saturated fat or sugar.    Fried food.    Make any other lifestyle changes recommended by your health care provider. This may include:    Not using any tobacco products including cigarettes, chewing tobacco, or electronic cigarettes.   Managing your weight.    Getting regular exercise.    Managing your blood pressure.    Limiting your alcohol intake.    Managing other health problems, such as diabetes.    If you need an MRI after your heart stent was placed, be sure to tell the health care provider who orders the MRI that you have a heart stent.    Keep all follow-up visits as directed by your health care provider.      Call Your Doctor If:    You have unusual pain at the radial/ulnar (wrist) site.  You have redness, warmth, swelling, or pain at the radial/ulnar (wrist) site.  You have drainage (other than a small amount of blood on the dressing).  `You have chills or a fever > 101.  Your arm becomes pale or dark, cool, tingly, or numb.  You develop chest pain, shortness of breath, feel faint or pass out.    You have heavy bleeding from the site.  If you do, hold pressure on the site for 20 minutes.  If the bleeding stops, apply a fresh bandage and call your cardiologist.  However, if you continue to have bleeding, maintain pressure and call 911.    You have any symptoms of a stroke.  Remember BE FAST  B-balance. Sudden trouble walking or loss of  balance.  E-eyes.  Sudden changes in how you see or a sudden onset of a very bad headache.   F-face. Sudden weakness or loss of feeling of the face or facial droop on one side.   A-arms Sudden weakness or numbness in one arm. One arm drifts down if they are both held out in front of you. This happens suddenly and usually on one side of the body.   S-speech.  Sudden trouble speaking, slurred speech or trouble understanding what people are saying.   T-time  Time to call emergency services.  Write down the symptoms and the time they started.

## 2022-05-18 NOTE — CONSULTS
Met with patient and wife, discussed benefits of cardiac rehab. Provided phase II information along with the contact information for cardiac rehab  at [facility]. Patient states he currently goes to the gym 5 days a week, but does nor do any cardio, but does walk with his wife in the evenings. Reviewed cardiovascular risk factors. Requested for referral to be sent to [place].

## 2022-05-19 VITALS
HEART RATE: 63 BPM | RESPIRATION RATE: 16 BRPM | OXYGEN SATURATION: 93 % | BODY MASS INDEX: 29.4 KG/M2 | WEIGHT: 194 LBS | SYSTOLIC BLOOD PRESSURE: 136 MMHG | DIASTOLIC BLOOD PRESSURE: 69 MMHG | TEMPERATURE: 97.7 F | HEIGHT: 68 IN

## 2022-05-19 LAB
ACT BLD: 279 SECONDS (ref 82–152)
ACT BLD: 333 SECONDS (ref 82–152)
ANION GAP SERPL CALCULATED.3IONS-SCNC: 9 MMOL/L (ref 5–15)
BUN SERPL-MCNC: 12 MG/DL (ref 8–23)
BUN/CREAT SERPL: 12.4 (ref 7–25)
CALCIUM SPEC-SCNC: 8.9 MG/DL (ref 8.6–10.5)
CHLORIDE SERPL-SCNC: 103 MMOL/L (ref 98–107)
CHOLEST SERPL-MCNC: 158 MG/DL (ref 0–200)
CO2 SERPL-SCNC: 26 MMOL/L (ref 22–29)
CREAT SERPL-MCNC: 0.97 MG/DL (ref 0.76–1.27)
DEPRECATED RDW RBC AUTO: 39.6 FL (ref 37–54)
EGFRCR SERPLBLD CKD-EPI 2021: 83.5 ML/MIN/1.73
ERYTHROCYTE [DISTWIDTH] IN BLOOD BY AUTOMATED COUNT: 12.3 % (ref 12.3–15.4)
GLUCOSE SERPL-MCNC: 140 MG/DL (ref 65–99)
HBA1C MFR BLD: 7.3 % (ref 4.8–5.6)
HCT VFR BLD AUTO: 39.1 % (ref 37.5–51)
HDLC SERPL-MCNC: 30 MG/DL (ref 40–60)
HGB BLD-MCNC: 13.6 G/DL (ref 13–17.7)
LDLC SERPL CALC-MCNC: 94 MG/DL (ref 0–100)
LDLC/HDLC SERPL: 2.97 {RATIO}
MCH RBC QN AUTO: 31 PG (ref 26.6–33)
MCHC RBC AUTO-ENTMCNC: 34.8 G/DL (ref 31.5–35.7)
MCV RBC AUTO: 89.1 FL (ref 79–97)
PLATELET # BLD AUTO: 218 10*3/MM3 (ref 140–450)
PMV BLD AUTO: 10.4 FL (ref 6–12)
POTASSIUM SERPL-SCNC: 3.7 MMOL/L (ref 3.5–5.2)
QT INTERVAL: 428 MS
QT INTERVAL: 430 MS
RBC # BLD AUTO: 4.39 10*6/MM3 (ref 4.14–5.8)
SARS-COV-2 ORF1AB RESP QL NAA+PROBE: NOT DETECTED
SODIUM SERPL-SCNC: 138 MMOL/L (ref 136–145)
TRIGL SERPL-MCNC: 194 MG/DL (ref 0–150)
VLDLC SERPL-MCNC: 34 MG/DL (ref 5–40)
WBC NRBC COR # BLD: 9.53 10*3/MM3 (ref 3.4–10.8)

## 2022-05-19 PROCEDURE — 80048 BASIC METABOLIC PNL TOTAL CA: CPT | Performed by: INTERNAL MEDICINE

## 2022-05-19 PROCEDURE — 63710000001 PANTOPRAZOLE 40 MG TABLET DELAYED-RELEASE: Performed by: INTERNAL MEDICINE

## 2022-05-19 PROCEDURE — A9270 NON-COVERED ITEM OR SERVICE: HCPCS | Performed by: INTERNAL MEDICINE

## 2022-05-19 PROCEDURE — 63710000001 ASPIRIN 81 MG CHEWABLE TABLET: Performed by: INTERNAL MEDICINE

## 2022-05-19 PROCEDURE — 80061 LIPID PANEL: CPT | Performed by: INTERNAL MEDICINE

## 2022-05-19 PROCEDURE — U0005 INFEC AGEN DETEC AMPLI PROBE: HCPCS | Performed by: INTERNAL MEDICINE

## 2022-05-19 PROCEDURE — 99217 PR OBSERVATION CARE DISCHARGE MANAGEMENT: CPT | Performed by: NURSE PRACTITIONER

## 2022-05-19 PROCEDURE — 93005 ELECTROCARDIOGRAM TRACING: CPT | Performed by: INTERNAL MEDICINE

## 2022-05-19 PROCEDURE — U0004 COV-19 TEST NON-CDC HGH THRU: HCPCS | Performed by: INTERNAL MEDICINE

## 2022-05-19 PROCEDURE — C9803 HOPD COVID-19 SPEC COLLECT: HCPCS | Performed by: INTERNAL MEDICINE

## 2022-05-19 PROCEDURE — 85027 COMPLETE CBC AUTOMATED: CPT | Performed by: INTERNAL MEDICINE

## 2022-05-19 PROCEDURE — 83036 HEMOGLOBIN GLYCOSYLATED A1C: CPT | Performed by: INTERNAL MEDICINE

## 2022-05-19 PROCEDURE — 63710000001 CLOPIDOGREL 75 MG TABLET: Performed by: INTERNAL MEDICINE

## 2022-05-19 PROCEDURE — G0378 HOSPITAL OBSERVATION PER HR: HCPCS

## 2022-05-19 RX ORDER — ASPIRIN 81 MG/1
81 TABLET, CHEWABLE ORAL DAILY
Qty: 30 TABLET | Refills: 11 | Status: SHIPPED | OUTPATIENT
Start: 2022-05-20

## 2022-05-19 RX ORDER — NITROGLYCERIN 0.4 MG/1
0.4 TABLET SUBLINGUAL
Qty: 25 TABLET | Refills: 3 | Status: SHIPPED | OUTPATIENT
Start: 2022-05-19

## 2022-05-19 RX ORDER — METOPROLOL SUCCINATE 25 MG/1
25 TABLET, EXTENDED RELEASE ORAL
Qty: 30 TABLET | Refills: 11 | Status: SHIPPED | OUTPATIENT
Start: 2022-05-19 | End: 2022-06-03 | Stop reason: SDUPTHER

## 2022-05-19 RX ORDER — NITROGLYCERIN 0.4 MG/1
0.4 TABLET SUBLINGUAL
Status: DISCONTINUED | OUTPATIENT
Start: 2022-05-19 | End: 2022-05-19 | Stop reason: HOSPADM

## 2022-05-19 RX ORDER — CLOPIDOGREL BISULFATE 75 MG/1
75 TABLET ORAL DAILY
Qty: 30 TABLET | Refills: 11 | Status: SHIPPED | OUTPATIENT
Start: 2022-05-20

## 2022-05-19 RX ORDER — PANTOPRAZOLE SODIUM 40 MG/1
40 TABLET, DELAYED RELEASE ORAL EVERY MORNING
Qty: 30 TABLET | Refills: 11 | Status: SHIPPED | OUTPATIENT
Start: 2022-05-20

## 2022-05-19 RX ADMIN — ASPIRIN 81 MG: 81 TABLET, CHEWABLE ORAL at 08:24

## 2022-05-19 RX ADMIN — CLOPIDOGREL 75 MG: 75 TABLET, FILM COATED ORAL at 08:24

## 2022-05-19 RX ADMIN — PANTOPRAZOLE SODIUM 40 MG: 40 TABLET, DELAYED RELEASE ORAL at 06:01

## 2022-05-19 NOTE — PROGRESS NOTES
[facility] Clinical Pharmacy Services: National Cardiology Data Registry (NCDR) Medication Review    Nhan James is s/p PCI with drug-eluding stent placement for unstable angina. Pharmacy to review discharge medications to make sure appropriate medications have been prescribed.    Patient has been discharged on the following:  · Aspirin 81 mg daily  · High Intensity Statin: Not prescribed  · Beta-blocker: Metoprolol XL 25 mg daily  · P2Y12 Inhibitor: Clopidogrel 75 mg daily  · LVEF>60% (no requirement for ACE/ARB)    Patient cannot tolerate statins due to severe myalgias, but will be prescribed Evolocumab in its place for cholesterol management.     These medications meet the requirements for NCDR discharge medication for chest pain and MI.    Lori Hart, Pharm.D., [facility]  Clinical Pharmacist  Phone Extension #[phone]

## 2022-05-19 NOTE — PROGRESS NOTES
Discharge Planning Assessment  [facility]     Patient Name: Nhan James  MRN: [id number]  Today's Date: 5/19/2022    Admit Date: 5/18/2022     Discharge Needs Assessment    No documentation.                Discharge Plan     Row Name 05/19/22 1632       Plan    Plan Home    Final Discharge Disposition Code 01 - home or self-care    Final Note Home              Continued Care and Services - Discharged on 5/19/2022 Admission date: 5/18/2022 - Discharge disposition: Home or Self Care   Coordination has not been started for this encounter.       Expected Discharge Date and Time     Expected Discharge Date Expected Discharge Time    May 19, 2022          Demographic Summary    No documentation.                Functional Status    No documentation.                Psychosocial    No documentation.                Abuse/Neglect    No documentation.                Legal    No documentation.                Substance Abuse    No documentation.                Patient Forms    No documentation.                   Martine Cavazos RN

## 2022-05-19 NOTE — PROGRESS NOTES
[facility] Clinical Pharmacy Services: Pharmacy Education - Post PCI Discharge Medication    Nhan James was counseled over post-PCI medications prior to discharge.  Counseling points included the followin) Clopidogrel's indication, patient's need for the medication, and dosing/frequency  2) Enforced the importance of taking clopidogrel/aspirin as instructed every day  3) Explained possible side effects of clopidogrel/aspirin therapy, including increased risk of bleeding, s/sx of bleeding, and increase in cold intolerance. Also talked about ways to control bleeding for minor cuts and scrapes.  4) Discussed all important drug interactions, including over-the-counter medications.    Patient also started on Repatha. Talked about patient's need for medications in light of stent placement, dosing/frequency, and side effect of injection site injection. I also explained this medication would probably require a PA.    Explained risk for thrombosis on new stent (especially in the first 3-6 months) and medication compliance. Patient also discharged on nitroglycerin sublingual tablets.    Patient expressed understanding and had no further questions.      Lori Hart, Pharm.D., [facility]   Clinical Staff Pharmacist   Phone Extension #[phone]

## 2022-05-19 NOTE — DISCHARGE SUMMARY
Patient Name: Nhan James  :1950  71 y.o.    Date of Admit: 2022  Date of Discharge:  2022    Discharge Diagnosis:  Problems Addressed this Visit        Cardiac and Vasculature    Unstable angina (HCC)    Relevant Medications    clopidogrel (PLAVIX) 75 MG tablet (Start on 2022)    metoprolol succinate XL (TOPROL-XL) 25 MG 24 hr tablet    nitroglycerin (NITROSTAT) 0.4 MG SL tablet    Other Relevant Orders    Cardiac Catheterization/Vascular Study (Completed)    Primary hypertension    Relevant Medications    metoprolol succinate XL (TOPROL-XL) 25 MG 24 hr tablet    Other Relevant Orders    Cardiac Catheterization/Vascular Study (Completed)       Endocrine and Metabolic    Diabetes mellitus (HCC)    Relevant Orders    Cardiac Catheterization/Vascular Study (Completed)      Other Visit Diagnoses     Coronary artery disease involving coronary bypass graft of native heart with unstable angina pectoris (HCC)    -  Primary    Relevant Medications    clopidogrel (PLAVIX) 75 MG tablet (Start on 2022)    metoprolol succinate XL (TOPROL-XL) 25 MG 24 hr tablet    nitroglycerin (NITROSTAT) 0.4 MG SL tablet    Other Relevant Orders    Ambulatory Referral to Cardiac Rehab      Diagnoses       Codes Comments    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 Chest pain suggestive of angina  2. Essential HTN  3. GERD  4. Dyslipidemia  5. Type 2 diabetes Mellitus    Hospital Course:     The patient is a 71 year old male who see's us for a history of hypertension and near syncope. He had a stress test  ordered but it was never performed back in 2015. He came in to the office with chest pain while doing work. We decided due to the nature of his symptoms to bring him in for diagnostic cardiac cath. Coronary anatomy revealed LM 20% stenosis, LAD 20% with diagonal 30%, L Cx 50% mid vessel. The rca had a mid 70-80% followed by a 95%. There was a small PDA of 70-80% that was not amendable to PCI. We did an angioplasty and stenting to the RCA. He was put on DAPT with aspirin and plavix post procedure. He has been intolerant of statins in the past and we are starting him on repatha. He has done well post procedure with no complications. He is doing well and is ready for Dc.   Procedures Performed  Procedure(s):  Coronary angiography  Left heart cath  Left ventriculography  Stent KEMAL coronary       Consults     No orders found for last 30 day(s).          Pertinent Test Results:   Results from last 7 days   Lab Units 05/19/22  0309   SODIUM mmol/L 138   POTASSIUM mmol/L 3.7   CHLORIDE mmol/L 103   CO2 mmol/L 26.0   BUN mg/dL 12   CREATININE mg/dL 0.97   CALCIUM mg/dL 8.9   GLUCOSE mg/dL 140*         @LABRCNT(bnp)@  Results from last 7 days   Lab Units 05/19/22  0309   WBC 10*3/mm3 9.53   HEMOGLOBIN g/dL 13.6   HEMATOCRIT % 39.1   PLATELETS 10*3/mm3 218             Results from last 7 days   Lab Units 05/19/22  0309   CHOLESTEROL mg/dL 158   TRIGLYCERIDES mg/dL 194*   HDL CHOL mg/dL 30*   LDL CHOL mg/dL 94       Condition on Discharge: stable    Discharge Medications     Discharge Medications      New Medications      Instructions Start Date   aspirin 81 MG chewable tablet   81 mg, Oral, Daily   Start Date: May 20, 2022     clopidogrel 75 MG tablet  Commonly known as: PLAVIX   75 mg, Oral, Daily   Start Date: May 20, 2022     Evolocumab solution auto-injector SureClick injection  Commonly known as: REPATHA   140 mg, Subcutaneous, Every 14 Days      metoprolol succinate XL 25 MG 24 hr tablet  Commonly known as: TOPROL-XL    25 mg, Oral, Every 24 Hours Scheduled      nitroglycerin 0.4 MG SL tablet  Commonly known as: NITROSTAT   0.4 mg, Sublingual, Every 5 Minutes PRN, Take no more than 3 doses in 15 minutes.      pantoprazole 40 MG EC tablet  Commonly known as: PROTONIX  Replaces: omeprazole 10 MG capsule   40 mg, Oral, Every Morning   Start Date: May 20, 2022        Continue These Medications      Instructions Start Date   ALPRAZolam 1 MG tablet  Commonly known as: XANAX   0.5 tablets, Oral, Every Night at Bedtime      BERBERINE COMPLEX PO   Oral      Biotin 10 MG capsule   Oral      coenzyme Q10 100 MG capsule   100 mg, Oral, Daily      L-ARGININE PO   Oral      lisinopril 10 MG tablet  Commonly known as: PRINIVIL,ZESTRIL   TAKE 1/2 TO 1 (ONE-HALF TO ONE) TABLET BY MOUTH ONCE DAILY FOR BLOOD PRESSURE GREATER THAN 140      MAGNESIUM 27 PO   Oral      multivitamin-iron-minerals-folic acid chewable tablet   1 tablet, Oral, Daily      NON FORMULARY   Tri-chromium      OMEGA-3 2100 PO   Oral      ZINC PICOLINATE PO   Oral         Stop These Medications    omeprazole 10 MG capsule  Commonly known as: prilOSEC  Replaced by: pantoprazole 40 MG EC tablet            Discharge Diet:     Activity at Discharge:   Activity Instructions      *PLEASE SEE INSTRUCTIONS INCLUDED IN THIS PACKET           Discharge disposition: home    Follow-up Appointments  No future appointments.  Additional Instructions for the Follow-ups that You Need to Schedule     Ambulatory Referral to Cardiac Rehab   As directed            Test Results Pending at Discharge  Pending Labs     Order Current Status    COVID PRE-OP / PRE-PROCEDURE SCREENING ORDER (NO ISOLATION) - Swab, Nasopharynx In process    COVID-19,APTIMA PANTHER(RAGINI),[facility]/ [facility], NP/OP SWAB IN UTM/VTM/SALINE TRANSPORT MEDIA,24 HR TAT - Swab, Nasopharynx In process           PRITI Yuan, [facility]  05/19/22  09:48 EDT    Time: Discharge 40 min  Electronically signed by Marium  Rena Rubi, APRN, 05/19/22, 9:48 AM EDT.

## 2022-06-02 PROBLEM — I25.10 CORONARY ARTERY DISEASE INVOLVING NATIVE CORONARY ARTERY OF NATIVE HEART WITHOUT ANGINA PECTORIS: Status: ACTIVE | Noted: 2022-06-02

## 2022-06-02 PROBLEM — Z95.5 S/P RIGHT CORONARY ARTERY (RCA) STENT PLACEMENT: Status: ACTIVE | Noted: 2022-06-02

## 2022-06-02 NOTE — PROGRESS NOTES
Date of Office Visit: 2022  Encounter Provider: PRITI Mix  Place of Service: [facility] CARDIOLOGY  Patient Name: Nhan James  :1950    Chief Complaint   Patient presents with   • Unstable angina   • Follow-up   :     HPI: Nhan James is a 72 y.o. male who is a patient of Dr. Gray.  He is new to me today and presents for a hospital follow-up.  He is a history of hypertension, hyperlipidemia, diabetes type 2 and near syncope.  He presented to [facility] on  for elective cardiac cath due to nature of his symptoms and chest pain while doing work.    Corornary anatomy revealed LM 20% stenosis, LAD 20% with diagonal 30%,LCx 50% mid vessel.  The RCA had mid 70 to 80% followed by a 95%.  There was a small PDA of 70 to 80% was nonamenable to PCI.  He underwent an angioplasty and stenting to the RCA with mixed Xience xochitl point 3.0 x 38 mm stent.  He was noted to have normal LV systolic function with an EF of greater than 60%.  He has been intolerant of statins in the past, therefore, Repatha was started.  He was placed on DAPT with aspirin and Plavix.     Today patient presented with no complaints of chest pain, shortness of breath or dizziness.  He states that after he takes his pills in the morning, he gets a little bit of indigestion that resolves on its own.  It appears that he was changed from omeprazole to Protonix during his admission.  He also states that he is a little bit more tired.  EKG shows sinus bradycardia with rate 47.  Blood pressure is controlled.  He states that he has a little bit of anxiety since he had his stents placed.  Both his mother and father and 4 out of 5 brothers have had either bypass surgery or stents placed; therefore he has a strong cardiac family history.  His prior authorization for Repatha has been approved and he will be starting it soon.    Previous testing and notes have been reviewed by  me.   Past Medical History:   Diagnosis Date   • History of hepatitis    • Hypertension    • IVAN (obstructive sleep apnea)    • PONV (postoperative nausea and vomiting)        Past Surgical History:   Procedure Laterality Date   • APPENDECTOMY     • CARDIAC CATHETERIZATION N/A 2022    Procedure: Coronary angiography;  Surgeon: Marilyn Gray MD;  Location:  [facility];  Service: Cardiovascular;  Laterality: N/A;   • CARDIAC CATHETERIZATION N/A 2022    Procedure: Left heart cath;  Surgeon: Marilyn Gray MD;  Location:  [facility];  Service: Cardiovascular;  Laterality: N/A;   • CARDIAC CATHETERIZATION N/A 2022    Procedure: Left ventriculography;  Surgeon: Marilyn Gray MD;  Location:  [facility];  Service: Cardiovascular;  Laterality: N/A;   • CARDIAC CATHETERIZATION N/A 2022    Procedure: Stent KEMAL coronary;  Surgeon: Marilyn Gray MD;  Location:  [facility];  Service: Cardiovascular;  Laterality: N/A;   • CHOLECYSTECTOMY     • FINGER SURGERY     • TONSILLECTOMY AND ADENOIDECTOMY         Social History     Socioeconomic History   • Marital status:    • Number of children: 2   Tobacco Use   • Smoking status: Former Smoker     Quit date:      Years since quittin.4   • Smokeless tobacco: Never Used   • Tobacco comment: caffeine use   Vaping Use   • Vaping Use: Never used   Substance and Sexual Activity   • Alcohol use: Yes     Comment: occ   • Drug use: Never       Family History   Problem Relation Age of Onset   • Heart disease Mother    • Diabetes Mother    • Hypertension Father    • Heart disease Father    • Diabetes Brother    • Hypertension Brother    • Heart disease Brother    • Hypertension Brother    • Heart disease Brother    • Heart disease Brother    • Cerebral aneurysm Paternal Aunt    • Cancer Paternal Aunt    • Cancer Paternal Uncle    • Diabetes Maternal Grandmother    • Hypertension  Maternal Grandfather    • Stroke Maternal Grandfather        Review of Systems   Constitutional: Negative.   HENT: Negative.    Eyes: Negative.    Respiratory: Negative.    Endocrine: Negative.    Hematologic/Lymphatic:        Asa/plavix   Skin: Negative.    Musculoskeletal: Negative.    Gastrointestinal: Negative.    Genitourinary: Negative.    Neurological: Negative.    Psychiatric/Behavioral: Negative.    Allergic/Immunologic: Negative.        No Known Allergies      Current Outpatient Medications:   •  ALPRAZolam (XANAX) 1 MG tablet, Take 0.5 tablets by mouth every night at bedtime., Disp: , Rfl:   •  aspirin 81 MG chewable tablet, Chew 1 tablet Daily., Disp: 30 tablet, Rfl: 11  •  Barberry-Oreg Grape-Goldenseal (BERBERINE COMPLEX PO), Take  by mouth., Disp: , Rfl:   •  Biotin 10 MG capsule, Take  by mouth., Disp: , Rfl:   •  clopidogrel (PLAVIX) 75 MG tablet, Take 1 tablet by mouth Daily., Disp: 30 tablet, Rfl: 11  •  coenzyme Q10 100 MG capsule, Take 100 mg by mouth Daily., Disp: , Rfl:   •  Evolocumab (REPATHA) solution auto-injector SureClick injection, Inject 1 mL under the skin into the appropriate area as directed Every 14 (Fourteen) Days., Disp: 2 pen, Rfl: 11  •  L-ARGININE PO, Take  by mouth., Disp: , Rfl:   •  lisinopril (PRINIVIL,ZESTRIL) 10 MG tablet, TAKE 1/2 TO 1 (ONE-HALF TO ONE) TABLET BY MOUTH ONCE DAILY FOR BLOOD PRESSURE GREATER THAN 140, Disp: , Rfl:   •  Magnesium Gluconate (MAGNESIUM 27 PO), Take  by mouth., Disp: , Rfl:   •  metoprolol succinate XL (TOPROL-XL) 25 MG 24 hr tablet, Take 0.5 tablets by mouth Daily., Disp: 30 tablet, Rfl: 11  •  multivitamin-iron-minerals-folic acid (CENTRUM) chewable tablet, Chew 1 tablet Daily., Disp: , Rfl:   •  nitroglycerin (NITROSTAT) 0.4 MG SL tablet, Place 1 tablet under the tongue Every 5 (Five) Minutes As Needed for Chest Pain (Only if SBP Greater Than 100). Take no more than 3 doses in 15 minutes., Disp: 25 tablet, Rfl: 3  •  NON FORMULARY,  "Tri-chromium, Disp: , Rfl:   •  Omega-3 Fatty Acids (OMEGA-3 2100 PO), Take  by mouth., Disp: , Rfl:   •  pantoprazole (PROTONIX) 40 MG EC tablet, Take 1 tablet by mouth Every Morning., Disp: 30 tablet, Rfl: 11  •  ZINC PICOLINATE PO, Take  by mouth., Disp: , Rfl:       Objective:     Vitals:    06/03/22 1219   BP: 140/84   BP Location: Left arm   Patient Position: Sitting   Pulse: (!) 47   SpO2: 98%   Weight: 87.2 kg (192 lb 3.2 oz)   Height: 172.7 cm (68\")     Body mass index is 29.22 kg/m².     Left heart cath 5/18/2022:  Mild nonobstructive disease of LAD  LCx 50% mid  80 and 95% serial mid to distal RCA stenosis   70 to 80% stenosis of small caliber mid posterior descending branch    2D Echocardiogram 11/5/2015:  LVEF 60%  RV with normal size and systolic function  Normal IVC with normal inspiratory collapse  Trace MR, trace TR  All valves structurally normal    PHYSICAL EXAM:    Constitutional:       Appearance: Healthy appearance. Not in distress.   Neck:      Vascular: No JVR. JVD normal.   Pulmonary:      Effort: Pulmonary effort is normal.      Breath sounds: Normal breath sounds. No wheezing. No rhonchi. No rales.   Chest:      Chest wall: Not tender to palpatation.   Cardiovascular:      PMI at left midclavicular line. Normal rate. Regular rhythm. Normal S1. Normal S2.      Murmurs: There is no murmur.      No gallop. No click. No rub.   Pulses:     Intact distal pulses.   Edema:     Peripheral edema absent.   Abdominal:      General: Bowel sounds are normal.      Palpations: Abdomen is soft.      Tenderness: There is no abdominal tenderness.   Musculoskeletal: Normal range of motion.         General: No tenderness. Skin:     General: Skin is warm and dry.   Neurological:      General: No focal deficit present.      Mental Status: Alert and oriented to person, place and time.           ECG 12 Lead    Date/Time: 6/3/2022 1:28 PM  Performed by: Leslie Up APRN  Authorized by: Leslie Up, " PRITI   Comparison: compared with previous ECG from 5/19/2022  Rhythm: sinus bradycardia  Rate: bradycardic  BPM: 47    Clinical impression: normal ECG              Assessment:       Diagnosis Plan   1. Primary hypertension  ECG 12 Lead   2. Type 2 diabetes mellitus without complication, without long-term current use of insulin ([facility])  ECG 12 Lead   3. Coronary artery disease involving native coronary artery of native heart without angina pectoris  ECG 12 Lead   4. S/P right coronary artery (RCA) stent placement  ECG 12 Lead   5. Bradycardia, sinus       Orders Placed This Encounter   Procedures   • ECG 12 Lead     This order was created via procedure documentation     Order Specific Question:   Release to patient     Answer:   Immediate          Plan:       1.  Coronary artery disease status post ACS/KEMAL to RCA: On DAPT with aspirin and Plavix, beta-blocker and Repatha.  No angina.  Stable EKG  2.  Hypertension: Well-controlled.  Will decrease metoprolol succinate to 12.5 mg daily.  He will continue to take his 5 mg of lisinopril daily.  We will continue to monitor his blood pressure at home  3.  Hyperlipidemia: Lipid panel shows total cholesterol 158, triglycerides 184, HDL 30 and LDL 94.  It has statin intolerance.  Started on Repatha  4.  Diabetes type 2:  Hemoglobin A1c 7.3  5.  Bradycardia: Likely contributor to his fatigue.  Decrease metoprolol to 12.5 mg daily    Mr. James will follow up with Dr. Gray in 6 to 8 weeks.  He will call sooner for any questions or concerns         Your medication list          Accurate as of Ava 3, 2022  1:29 PM. If you have any questions, ask your nurse or doctor.            CHANGE how you take these medications      Instructions Last Dose Given Next Dose Due   metoprolol succinate XL 25 MG 24 hr tablet  Commonly known as: TOPROL-XL  What changed: how much to take  Changed by: PRITI Mix      Take 0.5 tablets by mouth Daily.          CONTINUE taking these  medications      Instructions Last Dose Given Next Dose Due   ALPRAZolam 1 MG tablet  Commonly known as: XANAX      Take 0.5 tablets by mouth every night at bedtime.       aspirin 81 MG chewable tablet      Chew 1 tablet Daily.       BERBERINE COMPLEX PO      Take  by mouth.       Biotin 10 MG capsule      Take  by mouth.       clopidogrel 75 MG tablet  Commonly known as: PLAVIX      Take 1 tablet by mouth Daily.       coenzyme Q10 100 MG capsule      Take 100 mg by mouth Daily.       Evolocumab solution auto-injector SureClick injection  Commonly known as: REPATHA      Inject 1 mL under the skin into the appropriate area as directed Every 14 (Fourteen) Days.       L-ARGININE PO      Take  by mouth.       lisinopril 10 MG tablet  Commonly known as: PRINIVIL,ZESTRIL      TAKE 1/2 TO 1 (ONE-HALF TO ONE) TABLET BY MOUTH ONCE DAILY FOR BLOOD PRESSURE GREATER THAN 140       MAGNESIUM 27 PO      Take  by mouth.       multivitamin-iron-minerals-folic acid chewable tablet      Chew 1 tablet Daily.       nitroglycerin 0.4 MG SL tablet  Commonly known as: NITROSTAT      Place 1 tablet under the tongue Every 5 (Five) Minutes As Needed for Chest Pain (Only if SBP Greater Than 100). Take no more than 3 doses in 15 minutes.       NON FORMULARY      Tri-chromium       OMEGA-3 2100 PO      Take  by mouth.       pantoprazole 40 MG EC tablet  Commonly known as: PROTONIX      Take 1 tablet by mouth Every Morning.       ZINC PICOLINATE PO      Take  by mouth.             Where to Get Your Medications      These medications were sent to [id number] - [place], [place] - [address] [place] [facility] [place] [address] [facility] - [phone] [facility] [phone]   [address] , [facility] [address]    Phone: [phone]   · metoprolol succinate XL 25 MG 24 hr tablet           As always, it has been a pleasure to participate in your patient's care.      Sincerely,       PRITI Rivas

## 2022-06-03 ENCOUNTER — OFFICE VISIT (OUTPATIENT)
Dept: CARDIOLOGY | Facility: CLINIC | Age: 72
End: 2022-06-03

## 2022-06-03 VITALS
DIASTOLIC BLOOD PRESSURE: 84 MMHG | WEIGHT: 192.2 LBS | HEIGHT: 68 IN | OXYGEN SATURATION: 98 % | SYSTOLIC BLOOD PRESSURE: 140 MMHG | HEART RATE: 47 BPM | BODY MASS INDEX: 29.13 KG/M2

## 2022-06-03 DIAGNOSIS — R00.1 BRADYCARDIA, SINUS: ICD-10-CM

## 2022-06-03 DIAGNOSIS — Z95.5 S/P RIGHT CORONARY ARTERY (RCA) STENT PLACEMENT: ICD-10-CM

## 2022-06-03 DIAGNOSIS — E11.9 TYPE 2 DIABETES MELLITUS WITHOUT COMPLICATION, WITHOUT LONG-TERM CURRENT USE OF INSULIN: ICD-10-CM

## 2022-06-03 DIAGNOSIS — I10 PRIMARY HYPERTENSION: Primary | ICD-10-CM

## 2022-06-03 DIAGNOSIS — I25.10 CORONARY ARTERY DISEASE INVOLVING NATIVE CORONARY ARTERY OF NATIVE HEART WITHOUT ANGINA PECTORIS: ICD-10-CM

## 2022-06-03 PROCEDURE — 93000 ELECTROCARDIOGRAM COMPLETE: CPT | Performed by: NURSE PRACTITIONER

## 2022-06-03 PROCEDURE — 99214 OFFICE O/P EST MOD 30 MIN: CPT | Performed by: NURSE PRACTITIONER

## 2022-06-03 RX ORDER — METOPROLOL SUCCINATE 25 MG/1
12.5 TABLET, EXTENDED RELEASE ORAL
Qty: 30 TABLET | Refills: 11 | Status: SHIPPED | OUTPATIENT
Start: 2022-06-03

## 2022-06-07 DIAGNOSIS — Z95.5 STATUS POST INSERTION OF DRUG ELUTING CORONARY ARTERY STENT: ICD-10-CM

## 2022-06-07 DIAGNOSIS — I25.118 CORONARY ARTERY DISEASE INVOLVING NATIVE CORONARY ARTERY OF NATIVE HEART WITH OTHER FORM OF ANGINA PECTORIS: Primary | ICD-10-CM

## 2022-06-09 ENCOUNTER — LAB (OUTPATIENT)
Dept: LAB | Facility: HOSPITAL | Age: 72
End: 2022-06-09

## 2022-06-09 ENCOUNTER — TELEPHONE (OUTPATIENT)
Dept: CARDIOLOGY | Facility: CLINIC | Age: 72
End: 2022-06-09

## 2022-06-09 DIAGNOSIS — K62.5 RECTAL BLEED: Primary | ICD-10-CM

## 2022-06-09 DIAGNOSIS — K62.5 RECTAL BLEED: ICD-10-CM

## 2022-06-09 LAB
DEPRECATED RDW RBC AUTO: 41.8 FL (ref 37–54)
ERYTHROCYTE [DISTWIDTH] IN BLOOD BY AUTOMATED COUNT: 12.3 % (ref 12.3–15.4)
HCT VFR BLD AUTO: 40.9 % (ref 37.5–51)
HGB BLD-MCNC: 13.6 G/DL (ref 13–17.7)
MCH RBC QN AUTO: 30.5 PG (ref 26.6–33)
MCHC RBC AUTO-ENTMCNC: 33.3 G/DL (ref 31.5–35.7)
MCV RBC AUTO: 91.7 FL (ref 79–97)
PLATELET # BLD AUTO: 230 10*3/MM3 (ref 140–450)
PMV BLD AUTO: 10.4 FL (ref 6–12)
RBC # BLD AUTO: 4.46 10*6/MM3 (ref 4.14–5.8)
WBC NRBC COR # BLD: 7.96 10*3/MM3 (ref 3.4–10.8)

## 2022-06-09 PROCEDURE — 36415 COLL VENOUS BLD VENIPUNCTURE: CPT

## 2022-06-09 PROCEDURE — 85027 COMPLETE CBC AUTOMATED: CPT

## 2022-06-09 NOTE — TELEPHONE ENCOUNTER
Patient had a stent placed May 18th, he called and said that he is having bright red blood per rectum in the toilet and upon wiping for 3 days now.  He feels like the bleeding has gotten worse each day.  He is taking baby aspirin and plavix.      Do you have any further recommendations   on med changes or should he call his PCP instead?    Kassidy Orellana RN  Triage [facility]

## 2022-06-09 NOTE — TELEPHONE ENCOUNTER
Spoke with patient BP have been 120s-130s SBP and 75-80 DBP.  Occasional lightheadedness.  He is going to get labs drawn today after work.    Kassidy Orellana RN  Triage [facility]

## 2022-06-09 NOTE — TELEPHONE ENCOUNTER
If bright red, may be hemorrhoids.  Has he been checking BP, feel lightheaded? Let's have him get CBC at [facility] outpt lab. NO changes to meds at this time.

## 2022-06-17 ENCOUNTER — OFFICE VISIT (OUTPATIENT)
Dept: CARDIAC REHAB | Facility: HOSPITAL | Age: 72
End: 2022-06-17

## 2022-06-17 VITALS
WEIGHT: 188.93 LBS | RESPIRATION RATE: 16 BRPM | BODY MASS INDEX: 28.73 KG/M2 | HEART RATE: 54 BPM | SYSTOLIC BLOOD PRESSURE: 148 MMHG | OXYGEN SATURATION: 96 % | DIASTOLIC BLOOD PRESSURE: 80 MMHG

## 2022-06-17 DIAGNOSIS — Z95.5 S/P CORONARY ARTERY STENT PLACEMENT: Primary | ICD-10-CM

## 2022-06-17 PROCEDURE — 93798 PHYS/QHP OP CAR RHAB W/ECG: CPT

## 2022-06-17 NOTE — PROGRESS NOTES
Phase II and III Education    Discipline Teaching:  Cardiac Rehab Phase II [x]      Cardiac Rehab Phase III []      Pulmonary Rehab II []      Pulmonary Rehab III []      Peripheral Artery Disease []        Class Given:  Asthma Management []      Beginners Cardiac Rehab [x]      Beginners Pulmonary Rehab []      CAD I [x]      CAD II []      CHF []      Diabetes Mellitus []     Diet & Cholesterol [x]     Diet Consult []      Energy Conservation []     Exercise [x]     Grocery Store Tour []     Harmonica Therapy []     High Blood Pressure [x]     Living with COPD []     Medication Safety []     Peripheral Artery Disease []     S & S of Infection []      Stress [x]        Education Topics:  Angina []      Arrhythmias []      Asthma Management []      Beginning Cardiac Rehab []      Blood Pressure [x]     Blood Sugar []     Breathing Retrainment []     CHF []     Cooking []     Daily Weight []     Diabetes Mellitus [x]      Diet [x]     Energy Conservation []     Exercise []      Foot Care []      Grocery Store Tour []      Heart Disease []      Heart Function []      Incision Care []     Living with COPD []     Medication Safety []     PAD Symptoms/Treatment []     Reconditioning Exercises []     S & S Infection []     Smoking Consult []     Stress Management []     Test Results []       Teaching Aids:  Video [x]      PAD Handout []      Pulmonary Workbook []      CAD Teaching Packet []      Stress Teaching Packet [x]     Exercise Teaching Packet [x]      Diet Teaching Packet [x]      CHF Teaching Packet []      Blood Pressure Teaching Packet [x]      Smoking Cessation Packet []      Medication Safety Handout []      Pflex Training []      Diabetes Teaching Packet []      Harmonica Therapy Packet []        Teaching Method:  Discussion [x]      Written Information [x]      Audio Visual [x]      Demonstration []        Teaching Recipient:  Patient [x]      Family []      Friend []      Legal Guardian []      Primary  Care Giver []      Significant Other []        Barriers To Learning:  None [x]      Auditory []      Cultural []      Emotional []      Financial []      Language []    Mental []      Motivation []      Physical []      Reading Skills []      Voodoo []      Verbal/Cognitive []      Visual []      Written/Cognitive []        Teaching Response:  Verified Via Teach back []      Return Demo Via Teach Back []      Reinforcement Needed [x]      Unable to Return Demo []      Unable to Comprehend []      Declines Teaching  []        Additional Education Topics:  Educational handouts provided for Stress, Cholesterol, HTN, Cardiac Rehab, Diet & Coronary Artery Disease. Pt also given web sites and QR codes to access videos and additional online education. Pt stated he was able to utilize the QR codes and stated he had used QR codes in the past.     Follow Up Instruction Comment:    Please follow up with DM and assess use of QR codes and educational material

## 2022-06-21 ENCOUNTER — TREATMENT (OUTPATIENT)
Dept: CARDIAC REHAB | Facility: HOSPITAL | Age: 72
End: 2022-06-21

## 2022-06-21 DIAGNOSIS — Z95.5 S/P CORONARY ARTERY STENT PLACEMENT: Primary | ICD-10-CM

## 2022-06-21 PROCEDURE — 93798 PHYS/QHP OP CAR RHAB W/ECG: CPT

## 2022-07-27 NOTE — PROGRESS NOTES
Date of Office Visit: 2022  Encounter Provider: PRITI Mix  Place of Service: [facility] CARDIOLOGY  Patient Name: Nhan James  :1950    Chief Complaint   Patient presents with   • Primary hypertension   :     HPI: Nhan James is a 72 y.o. male who is a patient of Dr. Gray.  He  presents for follow-up.  He has a history of coronary artery disease.  On 2022, patient underwent angioplasty and stenting to the RCA with mixed Xience xochitl point 3.0 x 38 mm stent.  He also has a small PDA with 70 to 80% stenosis and unamendable to PCI.  His left ventricular systolic function remains normal.  Other medical history includes hypertension, hyperlipidemia, diabetes type 2 and near syncope.  He has a significant cardiac family history with both his mother and father and 4 out of 5 brothers having either coronary bypass surgery or stents placed.  Patient has intolerance to statins and therefore is taking Repatha.  He continues on DAPT with aspirin and Plavix.    On his office follow-up, patient was noted to have fatigue and bradycardia.  His metoprolol was decreased to 12.5 mg daily.  Patient called office on 2022 stating that he had bright red blood per rectum and upon wiping for about 3 days.  He was hemodynamically stable.  CBC was drawn and showed stable hemoglobin and hematocrit.  There was not a need to stop aspirin and/ or plavix.      Patient completed cardiac rehab.  He exercises on a normal basis.  He has no complaints of chest pain, palpitations, dizziness or shortness of breath.  He and his wife just sold their home and are moving down to [place] for the winter.  Will be back to this area in the spring.  He did not start Repatha due to cost.  We discussed importance of keeping his lipid panel in target range.  He is willing to try Zetia.  He notes improvement with fatigue after decreasing beta-blocker dosage.  Blood pressure is  well controlled.  EKG sinus bradycardia-asymptomatic.    Previous testing and notes have been reviewed by me.   Past Medical History:   Diagnosis Date   • CAD (coronary artery disease)    • Diabetes mellitus (HCC)    • History of hepatitis    • Hyperlipidemia    • Hypertension    • IVAN (obstructive sleep apnea)    • PONV (postoperative nausea and vomiting)        Past Surgical History:   Procedure Laterality Date   • APPENDECTOMY     • CARDIAC CATHETERIZATION N/A 2022    Procedure: Coronary angiography;  Surgeon: Marilyn Gray MD;  Location:  [facility];  Service: Cardiovascular;  Laterality: N/A;   • CARDIAC CATHETERIZATION N/A 2022    Procedure: Left heart cath;  Surgeon: Marilyn Gray MD;  Location:  [facility];  Service: Cardiovascular;  Laterality: N/A;   • CARDIAC CATHETERIZATION N/A 2022    Procedure: Left ventriculography;  Surgeon: Marilyn Gray MD;  Location:  [facility];  Service: Cardiovascular;  Laterality: N/A;   • CARDIAC CATHETERIZATION N/A 2022    Procedure: Stent KEMAL coronary;  Surgeon: Marilyn Gray MD;  Location:  [facility];  Service: Cardiovascular;  Laterality: N/A;   • CHOLECYSTECTOMY     • FINGER SURGERY     • TONSILLECTOMY AND ADENOIDECTOMY         Social History     Socioeconomic History   • Marital status:    • Number of children: 2   Tobacco Use   • Smoking status: Former Smoker     Quit date:      Years since quittin.5   • Smokeless tobacco: Never Used   • Tobacco comment: caffeine use   Vaping Use   • Vaping Use: Never used   Substance and Sexual Activity   • Alcohol use: Yes     Comment: occ   • Drug use: Never       Family History   Problem Relation Age of Onset   • Heart disease Mother    • Diabetes Mother    • Hypertension Father    • Heart disease Father    • Diabetes Brother    • Hypertension Brother    • Heart disease Brother    • Hypertension Brother    •  Heart disease Brother    • Heart disease Brother    • Cerebral aneurysm Paternal Aunt    • Cancer Paternal Aunt    • Cancer Paternal Uncle    • Diabetes Maternal Grandmother    • Hypertension Maternal Grandfather    • Stroke Maternal Grandfather        Review of Systems   Constitutional: Negative.   HENT: Negative.    Eyes: Negative.    Cardiovascular: Negative.    Respiratory: Negative.    Endocrine: Negative.    Hematologic/Lymphatic:        DAPT with aspirin/Plavix   Skin: Negative.    Musculoskeletal: Negative.    Gastrointestinal: Negative.    Genitourinary: Negative.    Neurological: Negative.    Psychiatric/Behavioral: Negative.    Allergic/Immunologic: Negative.        No Known Allergies      Current Outpatient Medications:   •  ALPRAZolam (XANAX) 1 MG tablet, Take 0.5 tablets by mouth every night at bedtime., Disp: , Rfl:   •  aspirin 81 MG chewable tablet, Chew 1 tablet Daily., Disp: 30 tablet, Rfl: 11  •  Barberry-Oreg Grape-Goldenseal (BERBERINE COMPLEX PO), Take  by mouth., Disp: , Rfl:   •  Biotin 10 MG capsule, Take  by mouth., Disp: , Rfl:   •  clopidogrel (PLAVIX) 75 MG tablet, Take 1 tablet by mouth Daily., Disp: 30 tablet, Rfl: 11  •  coenzyme Q10 100 MG capsule, Take 100 mg by mouth Daily., Disp: , Rfl:   •  Evolocumab (REPATHA) solution auto-injector SureClick injection, Inject 1 mL under the skin into the appropriate area as directed Every 14 (Fourteen) Days., Disp: 2 pen, Rfl: 11  •  L-ARGININE PO, Take  by mouth., Disp: , Rfl:   •  lisinopril (PRINIVIL,ZESTRIL) 10 MG tablet, TAKE 1/2 TO 1 (ONE-HALF TO ONE) TABLET BY MOUTH ONCE DAILY FOR BLOOD PRESSURE GREATER THAN 140, Disp: , Rfl:   •  Magnesium Gluconate (MAGNESIUM 27 PO), Take  by mouth., Disp: , Rfl:   •  metoprolol succinate XL (TOPROL-XL) 25 MG 24 hr tablet, Take 0.5 tablets by mouth Daily., Disp: 30 tablet, Rfl: 11  •  multivitamin-iron-minerals-folic acid (CENTRUM) chewable tablet, Chew 1 tablet Daily., Disp: , Rfl:   •  nitroglycerin  "(NITROSTAT) 0.4 MG SL tablet, Place 1 tablet under the tongue Every 5 (Five) Minutes As Needed for Chest Pain (Only if SBP Greater Than 100). Take no more than 3 doses in 15 minutes., Disp: 25 tablet, Rfl: 3  •  NON FORMULARY, Tri-chromium, Disp: , Rfl:   •  Omega-3 Fatty Acids (OMEGA-3 2100 PO), Take  by mouth., Disp: , Rfl:   •  pantoprazole (PROTONIX) 40 MG EC tablet, Take 1 tablet by mouth Every Morning., Disp: 30 tablet, Rfl: 11  •  ZINC PICOLINATE PO, Take  by mouth., Disp: , Rfl:   •  ezetimibe (ZETIA) 10 MG tablet, Take 1 tablet by mouth Daily., Disp: 90 tablet, Rfl: 3      Objective:     Vitals:    07/28/22 1328   BP: 122/80   BP Location: Left arm   Patient Position: Sitting   Pulse: (!) 47   SpO2: 98%   Weight: 85.5 kg (188 lb 9.6 oz)   Height: 172.7 cm (68\")     Body mass index is 28.68 kg/m².     Left heart cath 5/18/2022:  Left main 20% stenosis, LAD 20% with diagonal 30%, left circumflex 50% mid vessel.  The RCA had mid 70 to 80% followed by 95%.  There was a small PDA of 70 to 80% there was not amendable to PCI.      2D Echocardiogram 11/5/2015:  LVEF 60%  RV with normal size and systolic function  Normal IVC with normal inspiratory collapse  Trace MR, trace TR  All valves structurally normal    PHYSICAL EXAM:    Constitutional:       Appearance: Healthy appearance. Not in distress.   Neck:      Vascular: No JVR. JVD normal.   Pulmonary:      Effort: Pulmonary effort is normal.      Breath sounds: Normal breath sounds. No wheezing. No rhonchi. No rales.   Chest:      Chest wall: Not tender to palpatation.   Cardiovascular:      PMI at left midclavicular line. Bradycardia present. Regular rhythm. Normal S1. Normal S2.      Murmurs: There is no murmur.      No gallop. No click. No rub.   Pulses:     Intact distal pulses.   Edema:     Peripheral edema absent.   Abdominal:      General: Bowel sounds are normal.      Palpations: Abdomen is soft.      Tenderness: There is no abdominal tenderness. "   Musculoskeletal: Normal range of motion.         General: No tenderness. Skin:     General: Skin is warm and dry.   Neurological:      General: No focal deficit present.      Mental Status: Alert and oriented to person, place and time.           ECG 12 Lead    Date/Time: 7/28/2022 2:26 PM  Performed by: Leslie Up APRN  Authorized by: Leslie Up APRN   Comparison: compared with previous ECG from 6/3/2022  Similar to previous ECG  Rhythm: sinus bradycardia  Rate: bradycardic  BPM: 47  Conduction: conduction normal  ST Segments: ST segments normal  T Waves: T waves normal                Assessment:       Diagnosis Plan   1. Coronary artery disease involving native coronary artery of native heart without angina pectoris  Lipid Panel   2. S/P right coronary artery (RCA) stent placement  Lipid Panel   3. Primary hypertension     4. Type 2 diabetes mellitus without complication, without long-term current use of insulin ([facility])       Orders Placed This Encounter   Procedures   • Lipid Panel     Standing Status:   Future     Standing Expiration Date:   7/28/2023     Order Specific Question:   Release to patient     Answer:   Immediate          Plan:       1.  Coronary artery disease: Status post PCI/KEMAL to RCA.  On DAPT with aspirin and Plavix, beta-blocker.  Intolerant to statin.  Will start Zetia.  2.  Hypertension:   3.  Hyperlipidemia: Intolerance to statin.  Repatha too costly.  We will start Zetia and recheck lipid panel when he returns to the area in spring.  4.  Diabetes type 2: Hemoglobin A1c 7.3  5.  Significant cardiac family history  6.  Sinus bradycardia: Asymptomatic    Mr. James will follow up with Dr. Gray in the spring when he returns which should be about 6 to 8 months.  He will call sooner for any questions or concerns.         Your medication list          Accurate as of July 28, 2022  2:22 PM. If you have any questions, ask your nurse or doctor.            START taking these  medications      Instructions Last Dose Given Next Dose Due   ezetimibe 10 MG tablet  Commonly known as: ZETIA  Started by: PRITI Mix      Take 1 tablet by mouth Daily.          CONTINUE taking these medications      Instructions Last Dose Given Next Dose Due   ALPRAZolam 1 MG tablet  Commonly known as: XANAX      Take 0.5 tablets by mouth every night at bedtime.       aspirin 81 MG chewable tablet      Chew 1 tablet Daily.       BERBERINE COMPLEX PO      Take  by mouth.       Biotin 10 MG capsule      Take  by mouth.       clopidogrel 75 MG tablet  Commonly known as: PLAVIX      Take 1 tablet by mouth Daily.       coenzyme Q10 100 MG capsule      Take 100 mg by mouth Daily.       Evolocumab solution auto-injector SureClick injection  Commonly known as: REPATHA      Inject 1 mL under the skin into the appropriate area as directed Every 14 (Fourteen) Days.       L-ARGININE PO      Take  by mouth.       lisinopril 10 MG tablet  Commonly known as: PRINIVILZESTRIL      TAKE 1/2 TO 1 (ONE-HALF TO ONE) TABLET BY MOUTH ONCE DAILY FOR BLOOD PRESSURE GREATER THAN 140       MAGNESIUM 27 PO      Take  by mouth.       metoprolol succinate XL 25 MG 24 hr tablet  Commonly known as: TOPROL-XL      Take 0.5 tablets by mouth Daily.       multivitamin-iron-minerals-folic acid chewable tablet      Chew 1 tablet Daily.       nitroglycerin 0.4 MG SL tablet  Commonly known as: NITROSTAT      Place 1 tablet under the tongue Every 5 (Five) Minutes As Needed for Chest Pain (Only if SBP Greater Than 100). Take no more than 3 doses in 15 minutes.       NON FORMULARY      Tri-chromium       OMEGA-3 2100 PO      Take  by mouth.       pantoprazole 40 MG EC tablet  Commonly known as: PROTONIX      Take 1 tablet by mouth Every Morning.       ZINC PICOLINATE PO      Take  by mouth.             Where to Get Your Medications      These medications were sent to [id number] - [place], [place] - [address] AT [facility] OF [address]  [address] [facility] - [phone] [facility] [phone] FX  [address] [place] [id number]    Phone: [phone]   · ezetimibe 10 MG tablet           As always, it has been a pleasure to participate in your patient's care.      Sincerely,       PRITI Rivas

## 2022-07-28 ENCOUNTER — OFFICE VISIT (OUTPATIENT)
Dept: CARDIOLOGY | Facility: CLINIC | Age: 72
End: 2022-07-28

## 2022-07-28 VITALS
HEART RATE: 47 BPM | WEIGHT: 188.6 LBS | DIASTOLIC BLOOD PRESSURE: 80 MMHG | BODY MASS INDEX: 28.58 KG/M2 | SYSTOLIC BLOOD PRESSURE: 122 MMHG | HEIGHT: 68 IN | OXYGEN SATURATION: 98 %

## 2022-07-28 DIAGNOSIS — I25.10 CORONARY ARTERY DISEASE INVOLVING NATIVE CORONARY ARTERY OF NATIVE HEART WITHOUT ANGINA PECTORIS: Primary | ICD-10-CM

## 2022-07-28 DIAGNOSIS — E11.9 TYPE 2 DIABETES MELLITUS WITHOUT COMPLICATION, WITHOUT LONG-TERM CURRENT USE OF INSULIN: ICD-10-CM

## 2022-07-28 DIAGNOSIS — I10 PRIMARY HYPERTENSION: ICD-10-CM

## 2022-07-28 DIAGNOSIS — Z95.5 S/P RIGHT CORONARY ARTERY (RCA) STENT PLACEMENT: ICD-10-CM

## 2022-07-28 DIAGNOSIS — R00.1 BRADYCARDIA, SINUS: ICD-10-CM

## 2022-07-28 PROCEDURE — 99214 OFFICE O/P EST MOD 30 MIN: CPT | Performed by: NURSE PRACTITIONER

## 2022-07-28 PROCEDURE — 93000 ELECTROCARDIOGRAM COMPLETE: CPT | Performed by: NURSE PRACTITIONER

## 2022-07-28 RX ORDER — EZETIMIBE 10 MG/1
10 TABLET ORAL DAILY
Qty: 90 TABLET | Refills: 3 | Status: SHIPPED | OUTPATIENT
Start: 2022-07-28

## 2022-12-09 ENCOUNTER — OFFICE VISIT (OUTPATIENT)
Dept: CARDIOLOGY | Facility: CLINIC | Age: 72
End: 2022-12-09

## 2022-12-09 VITALS
WEIGHT: 190.8 LBS | SYSTOLIC BLOOD PRESSURE: 132 MMHG | HEIGHT: 68 IN | BODY MASS INDEX: 28.92 KG/M2 | DIASTOLIC BLOOD PRESSURE: 70 MMHG | HEART RATE: 56 BPM

## 2022-12-09 DIAGNOSIS — Z99.89 OSA ON CPAP: ICD-10-CM

## 2022-12-09 DIAGNOSIS — I25.10 CORONARY ARTERY DISEASE INVOLVING NATIVE CORONARY ARTERY OF NATIVE HEART WITHOUT ANGINA PECTORIS: Primary | ICD-10-CM

## 2022-12-09 DIAGNOSIS — R00.1 BRADYCARDIA, SINUS: ICD-10-CM

## 2022-12-09 DIAGNOSIS — G47.33 OSA ON CPAP: ICD-10-CM

## 2022-12-09 DIAGNOSIS — I10 PRIMARY HYPERTENSION: ICD-10-CM

## 2022-12-09 DIAGNOSIS — E11.9 TYPE 2 DIABETES MELLITUS WITHOUT COMPLICATION, WITHOUT LONG-TERM CURRENT USE OF INSULIN: ICD-10-CM

## 2022-12-09 DIAGNOSIS — Z95.5 S/P RIGHT CORONARY ARTERY (RCA) STENT PLACEMENT: ICD-10-CM

## 2022-12-09 PROCEDURE — 99214 OFFICE O/P EST MOD 30 MIN: CPT | Performed by: INTERNAL MEDICINE

## 2022-12-09 PROCEDURE — 93000 ELECTROCARDIOGRAM COMPLETE: CPT | Performed by: INTERNAL MEDICINE
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
## 2023-05-08 RX ORDER — PANTOPRAZOLE SODIUM 40 MG/1
40 TABLET, DELAYED RELEASE ORAL EVERY MORNING
Qty: 30 TABLET | Refills: 11 | Status: SHIPPED | OUTPATIENT
Start: 2023-05-08

## 2023-05-15 RX ORDER — ASPIRIN 81 MG/1
81 TABLET, CHEWABLE ORAL DAILY
Qty: 30 TABLET | Refills: 11 | Status: SHIPPED | OUTPATIENT
Start: 2023-05-15

## 2023-05-17 RX ORDER — PANTOPRAZOLE SODIUM 40 MG/1
40 TABLET, DELAYED RELEASE ORAL EVERY MORNING
Qty: 30 TABLET | Refills: 11 | Status: CANCELLED | OUTPATIENT
Start: 2023-05-17

## 2023-05-17 RX ORDER — ASPIRIN 81 MG/1
81 TABLET, CHEWABLE ORAL DAILY
Qty: 30 TABLET | Refills: 11 | Status: CANCELLED | OUTPATIENT
Start: 2023-05-17

## 2023-05-17 NOTE — TELEPHONE ENCOUNTER
Patient is not sure if you would like for him to continue taking Aspirin 81 mg , Protonix 40 mg ? If pt is staying on current med's a refill is needed.

## 2023-05-17 NOTE — TELEPHONE ENCOUNTER
He needs to continue the aspirin.  I would have him check with Dr. Hickey if he should continue the pantoprazole.

## 2023-05-18 ENCOUNTER — TRANSCRIBE ORDERS (OUTPATIENT)
Dept: DIABETES SERVICES | Facility: HOSPITAL | Age: 73
End: 2023-05-18
Payer: MEDICARE

## 2023-05-18 DIAGNOSIS — E11.8 TYPE 2 DIABETES MELLITUS WITH UNSPECIFIED COMPLICATIONS: Primary | ICD-10-CM

## 2023-05-30 ENCOUNTER — OFFICE VISIT (OUTPATIENT)
Dept: DIABETES SERVICES | Facility: HOSPITAL | Age: 73
End: 2023-05-30

## 2023-05-30 VITALS
SYSTOLIC BLOOD PRESSURE: 152 MMHG | HEART RATE: 62 BPM | WEIGHT: 195.2 LBS | OXYGEN SATURATION: 97 % | HEIGHT: 68 IN | DIASTOLIC BLOOD PRESSURE: 58 MMHG | BODY MASS INDEX: 29.58 KG/M2

## 2023-05-30 DIAGNOSIS — E66.3 OVERWEIGHT (BMI 25.0-29.9): ICD-10-CM

## 2023-05-30 DIAGNOSIS — E11.65 UNCONTROLLED TYPE 2 DIABETES MELLITUS WITH HYPERGLYCEMIA: Primary | ICD-10-CM

## 2023-05-30 DIAGNOSIS — N18.2 TYPE 2 DIABETES MELLITUS WITH STAGE 2 CHRONIC KIDNEY DISEASE, WITHOUT LONG-TERM CURRENT USE OF INSULIN: ICD-10-CM

## 2023-05-30 DIAGNOSIS — E11.22 TYPE 2 DIABETES MELLITUS WITH STAGE 2 CHRONIC KIDNEY DISEASE, WITHOUT LONG-TERM CURRENT USE OF INSULIN: ICD-10-CM

## 2023-05-30 PROBLEM — K21.9 ESOPHAGEAL REFLUX: Status: ACTIVE | Noted: 2023-05-30

## 2023-05-30 PROBLEM — H93.19 RINGING IN EARS: Status: ACTIVE | Noted: 2023-05-30

## 2023-05-30 LAB — GLUCOSE BLDC GLUCOMTR-MCNC: 271 MG/DL (ref 70–99)

## 2023-05-30 PROCEDURE — 82948 REAGENT STRIP/BLOOD GLUCOSE: CPT | Performed by: NURSE PRACTITIONER
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
## 2023-06-02 ENCOUNTER — PATIENT ROUNDING (BHMG ONLY) (OUTPATIENT)
Dept: DIABETES SERVICES | Facility: HOSPITAL | Age: 73
End: 2023-06-02

## 2023-06-02 NOTE — PROGRESS NOTES
June 2, 2023    Hello, may I speak with Nhan James?    My name is Isabel Boyd      I am  with [facility]  [address] [place] [address] [facility] [address] [phone].    Before we get started may I verify your date of birth? 1950    I am calling to officially welcome you to our practice and ask about your recent visit. Is this a good time to talk? no    Message left per script.  Thank you, and have a great day.

## 2023-06-09 ENCOUNTER — OFFICE VISIT (OUTPATIENT)
Dept: CARDIOLOGY | Facility: CLINIC | Age: 73
End: 2023-06-09
Payer: MEDICARE

## 2023-06-09 VITALS
BODY MASS INDEX: 28.49 KG/M2 | HEART RATE: 55 BPM | OXYGEN SATURATION: 99 % | SYSTOLIC BLOOD PRESSURE: 120 MMHG | WEIGHT: 188 LBS | DIASTOLIC BLOOD PRESSURE: 70 MMHG | HEIGHT: 68 IN

## 2023-06-09 DIAGNOSIS — I20.0 UNSTABLE ANGINA: ICD-10-CM

## 2023-06-09 DIAGNOSIS — Z99.89 OSA ON CPAP: ICD-10-CM

## 2023-06-09 DIAGNOSIS — E11.59 TYPE 2 DIABETES MELLITUS WITH OTHER CIRCULATORY COMPLICATION, WITHOUT LONG-TERM CURRENT USE OF INSULIN: ICD-10-CM

## 2023-06-09 DIAGNOSIS — Z95.5 S/P RIGHT CORONARY ARTERY (RCA) STENT PLACEMENT: ICD-10-CM

## 2023-06-09 DIAGNOSIS — G47.33 OSA ON CPAP: ICD-10-CM

## 2023-06-09 DIAGNOSIS — I25.10 CORONARY ARTERY DISEASE INVOLVING NATIVE CORONARY ARTERY OF NATIVE HEART WITHOUT ANGINA PECTORIS: Primary | ICD-10-CM

## 2023-06-09 NOTE — PROGRESS NOTES
Subjective:     Encounter Date:06/09/2023      Patient ID: Nhan James is a 73 y.o. male.    Chief Complaint:  History of Present Illness    This is a 73-year-old with hypertension, acid reflux, coronary artery disease status post drug-eluting stent placement of the right coronary artery, who presents for follow-up.     He presents today for routine 6-month follow-up.  Since his last office visit he has only had 1 episode of chest tightness that occurred after he performed a lot of work outside.  This was after working for few hours and walking back and forth with heavy lifting.  He feels like he just overdid it.  He feels like the discomfort was more of a muscle tightness.  The symptoms are different from his prior anginal symptoms.  He otherwise denies any symptoms of chest discomfort similar to his prior angina.  Denies any shortness of breath, palpitations, orthopnea, near-syncope or syncope or lower extremity edema.  He is interested in coming off of the clopidogrel.    Prior History:  I saw the patient in the past in 10/2015 for complaints of near syncope.  He had undergone an echocardiogram prior to that office visit that was unremarkable.  I recommended proceeding with a stress test because of symptoms as diaphoresis associated with his near-syncope.  Testing was never completed.      I did not see him again until 5/2022 when he presented with complaints of chest pain.  He reported an episode 4 days prior of midsternal chest pain that radiated across his chest to his bilateral upper arms and was associated with bilateral arm numbness.  He denied any associated nausea, vomiting, diaphoresis, or shortness of breath.  As soon as the symptoms started he turned right back around and went back to his house.  Once he got home he took a aspirin and sat down.  After resting for a few minutes the symptoms resolved.  He did report a significant family history of coronary artery disease including his  father who had his first myocardial infarction at the age of 53 and his brother who had a CABG at the age of 52.     Based on his symptoms concerning for unstable angina I recommended proceeding directly with a cardiac catheterization.  This was performed on 5/18/2022.  Coronary angiograms revealed 50% stenosis of his mid left circumflex artery, mild nonobstructive disease of the LAD, 80 and 95% serial mid to distal right coronary artery stenosis status post drug-eluting stent placement with a sign Skypoint 3.0 x 38 mm stent (postdilated with 3.5 mm noncompliant balloon), and a 70 to 80% stenosis of a small caliber mid posterior descending branch.     In follow-up he was seen by PRITI Rivas on 6/3/2022 at which time he denies any further symptoms.  He was started on Repatha due to history of statin intolerance.  He was then seen back in follow-up by PRITI Rivas on 7/28/2022 at which time he continued to be doing well however he was unable to afford the Repatha.  At the time he was switched to ezetimibe.      Review of Systems   Constitutional: Negative for malaise/fatigue.   HENT:  Negative for hearing loss, hoarse voice, nosebleeds and sore throat.    Eyes:  Negative for pain.   Cardiovascular:  Positive for chest pain. Negative for claudication, cyanosis, dyspnea on exertion, irregular heartbeat, leg swelling, near-syncope, orthopnea, palpitations, paroxysmal nocturnal dyspnea and syncope.   Respiratory:  Negative for shortness of breath and snoring.    Endocrine: Negative for cold intolerance, heat intolerance, polydipsia, polyphagia and polyuria.   Skin:  Negative for itching and rash.   Musculoskeletal:  Negative for arthritis, falls, joint pain, joint swelling, muscle cramps, muscle weakness and myalgias.   Gastrointestinal:  Negative for constipation, diarrhea, dysphagia, heartburn, hematemesis, hematochezia, melena, nausea and vomiting.   Genitourinary:  Negative for frequency, hematuria and  hesitancy.   Neurological:  Negative for excessive daytime sleepiness, dizziness, headaches, light-headedness, numbness and weakness.   Psychiatric/Behavioral:  Negative for depression. The patient is not nervous/anxious.        Current Outpatient Medications:     ALPRAZolam (XANAX) 1 MG tablet, Take 0.5 tablets by mouth every night at bedtime., Disp: , Rfl:     aspirin 81 MG chewable tablet, Chew 1 tablet Daily., Disp: 30 tablet, Rfl: 11    Barberry-Oreg Grape-Goldenseal (BERBERINE COMPLEX PO), Take  by mouth., Disp: , Rfl:     Biotin 10 MG capsule, Take  by mouth., Disp: , Rfl:     clopidogrel (PLAVIX) 75 MG tablet, Take 1 tablet by mouth Daily., Disp: 30 tablet, Rfl: 11    coenzyme Q10 100 MG capsule, Take 1 capsule by mouth Daily., Disp: , Rfl:     Evolocumab (REPATHA) solution auto-injector SureClick injection, Inject 1 mL under the skin into the appropriate area as directed Every 14 (Fourteen) Days., Disp: 2 pen, Rfl: 11    ezetimibe (ZETIA) 10 MG tablet, Take 1 tablet by mouth Daily., Disp: 90 tablet, Rfl: 3    L-ARGININE PO, Take  by mouth., Disp: , Rfl:     lisinopril (PRINIVIL,ZESTRIL) 10 MG tablet, TAKE 1/2 TO 1 (ONE-HALF TO ONE) TABLET BY MOUTH ONCE DAILY FOR BLOOD PRESSURE GREATER THAN 140, Disp: , Rfl:     Magnesium Gluconate (MAGNESIUM 27 PO), Take  by mouth., Disp: , Rfl:     metoprolol succinate XL (TOPROL-XL) 25 MG 24 hr tablet, Take 0.5 tablets by mouth Daily., Disp: 30 tablet, Rfl: 11    multivitamin-iron-minerals-folic acid (CENTRUM) chewable tablet, Chew 1 tablet Daily., Disp: , Rfl:     nitroglycerin (NITROSTAT) 0.4 MG SL tablet, Place 1 tablet under the tongue Every 5 (Five) Minutes As Needed for Chest Pain (Only if SBP Greater Than 100). Take no more than 3 doses in 15 minutes., Disp: 25 tablet, Rfl: 3    NON FORMULARY, Tri-chromium, Disp: , Rfl:     Omega-3 Fatty Acids (OMEGA-3 2100 PO), Take  by mouth., Disp: , Rfl:     pantoprazole (PROTONIX) 40 MG EC tablet, Take 1 tablet by mouth Every  Morning., Disp: 30 tablet, Rfl: 11    ZINC PICOLINATE PO, Take  by mouth., Disp: , Rfl:     Past Medical History:   Diagnosis Date    CAD (coronary artery disease)     Diabetes mellitus     History of hepatitis     Hyperlipidemia     Hypertension     IVAN (obstructive sleep apnea)     PONV (postoperative nausea and vomiting)        Past Surgical History:   Procedure Laterality Date    APPENDECTOMY      CARDIAC CATHETERIZATION N/A 2022    Procedure: Coronary angiography;  Surgeon: Marilyn Gray MD;  Location:  [facility] CATH INVASIVE LOCATION;  Service: Cardiovascular;  Laterality: N/A;    CARDIAC CATHETERIZATION N/A 2022    Procedure: Left heart cath;  Surgeon: Marilyn Gray MD;  Location:  [facility] INVASIVE LOCATION;  Service: Cardiovascular;  Laterality: N/A;    CARDIAC CATHETERIZATION N/A 2022    Procedure: Left ventriculography;  Surgeon: Marilyn Gray MD;  Location:  [facility] C[facility] INVASIVE LOCATION;  Service: Cardiovascular;  Laterality: N/A;    CARDIAC CATHETERIZATION N/A 2022    Procedure: Stent KEMAL coronary;  Surgeon: Marilyn Gray MD;  Location:  [facility]ATH INVASIVE LOCATION;  Service: Cardiovascular;  Laterality: N/A;    CHOLECYSTECTOMY      FINGER SURGERY      TONSILLECTOMY AND ADENOIDECTOMY         Family History   Problem Relation Age of Onset    Heart disease Mother     Diabetes Mother     Diabetes Father     Hypertension Father     Heart disease Father     Diabetes Brother     Hypertension Brother     Heart disease Brother     Hypertension Brother     Heart disease Brother     Heart disease Brother     Diabetes Maternal Grandmother     Hypertension Maternal Grandfather     Stroke Maternal Grandfather     Cerebral aneurysm Paternal Aunt     Cancer Paternal Aunt     Cancer Paternal Uncle        Social History     Tobacco Use    Smoking status: Former     Types: Cigarettes     Quit date:      Years since quittin.4    Smokeless tobacco: Never    Tobacco comments:      "caffeine use   Vaping Use    Vaping Use: Never used   Substance Use Topics    Alcohol use: Yes     Comment: occ    Drug use: Never         ECG 12 Lead    Date/Time: 6/9/2023 1:53 PM  Performed by: Marilyn Gray MD  Authorized by: Marilyn Gray MD   Comparison: compared with previous ECG   Similar to previous ECG  Rhythm: sinus rhythm           Objective:     Visit Vitals  /70 (BP Location: Right arm, Patient Position: Sitting, Cuff Size: Adult)   Pulse 55   Ht 172.7 cm (68\")   Wt 85.3 kg (188 lb)   SpO2 99%   BMI 28.59 kg/m²         Constitutional:       Appearance: Normal appearance. Well-developed.   HENT:      Head: Normocephalic and atraumatic.   Neck:      Vascular: No carotid bruit or JVD.   Pulmonary:      Effort: Pulmonary effort is normal.      Breath sounds: Normal breath sounds.   Cardiovascular:      Normal rate. Regular rhythm.      No gallop.    Pulses:     Radial: 2+ bilaterally.  Edema:     Peripheral edema absent.   Abdominal:      Palpations: Abdomen is soft.   Skin:     General: Skin is warm and dry.   Neurological:      Mental Status: Alert and oriented to person, place, and time.           Assessment:          Diagnosis Plan   1. Coronary artery disease involving native coronary artery of native heart without angina pectoris        2. Unstable angina        3. S/P right coronary artery (RCA) stent placement        4. Type 2 diabetes mellitus with other circulatory complication, without long-term current use of insulin        5. IVAN on CPAP               Plan:       1.  Coronary artery disease.  Appears to be stable.  EKG shows no acute changes.  His recent episode of chest tightness appears to be more musculoskeletal than anginal.  We discussed options regarding clopidogrel.  We could certainly continue the clopidogrel in light of his known small vessel disease.  However the patient is interested in discontinuing at this time.  He will discontinue this and continue with aspirin " only.  2.  Hypertension.  Well-controlled on his current regimen medications.  3.  Hyperlipidemia.  He is statin intolerant.  PCSK9 inhibitor was not covered.  He remains on ezetimibe.  4.  Diabetes mellitus type 2  5.  Obstructive sleep apnea.    We will plan on seeing the patient back again in 6 months.

## 2023-08-24 ENCOUNTER — LAB (OUTPATIENT)
Dept: LAB | Facility: HOSPITAL | Age: 73
End: 2023-08-24
Payer: MEDICARE

## 2023-08-24 ENCOUNTER — TRANSCRIBE ORDERS (OUTPATIENT)
Dept: LAB | Facility: HOSPITAL | Age: 73
End: 2023-08-24
Payer: MEDICARE

## 2023-08-24 DIAGNOSIS — Z79.899 ENCOUNTER FOR LONG-TERM (CURRENT) USE OF OTHER MEDICATIONS: ICD-10-CM

## 2023-08-24 DIAGNOSIS — E11.9 DIABETES MELLITUS WITHOUT COMPLICATION: Primary | ICD-10-CM

## 2023-08-24 DIAGNOSIS — E11.9 DIABETES MELLITUS WITHOUT COMPLICATION: ICD-10-CM

## 2023-08-24 LAB — HBA1C MFR BLD: 7 % (ref 4.8–5.6)

## 2023-08-24 PROCEDURE — 36415 COLL VENOUS BLD VENIPUNCTURE: CPT

## 2023-08-24 PROCEDURE — 83036 HEMOGLOBIN GLYCOSYLATED A1C: CPT

## 2023-12-19 ENCOUNTER — OFFICE VISIT (OUTPATIENT)
Age: 73
End: 2023-12-19
Payer: MEDICARE

## 2023-12-19 VITALS
HEART RATE: 56 BPM | SYSTOLIC BLOOD PRESSURE: 120 MMHG | DIASTOLIC BLOOD PRESSURE: 80 MMHG | HEIGHT: 68 IN | BODY MASS INDEX: 28.95 KG/M2 | WEIGHT: 191 LBS | OXYGEN SATURATION: 98 %

## 2023-12-19 DIAGNOSIS — I10 PRIMARY HYPERTENSION: ICD-10-CM

## 2023-12-19 DIAGNOSIS — I20.0 UNSTABLE ANGINA: ICD-10-CM

## 2023-12-19 DIAGNOSIS — I25.10 CORONARY ARTERY DISEASE INVOLVING NATIVE CORONARY ARTERY OF NATIVE HEART WITHOUT ANGINA PECTORIS: ICD-10-CM

## 2023-12-19 DIAGNOSIS — Z95.5 S/P RIGHT CORONARY ARTERY (RCA) STENT PLACEMENT: ICD-10-CM

## 2023-12-19 DIAGNOSIS — R00.1 BRADYCARDIA, SINUS: Primary | ICD-10-CM

## 2023-12-19 NOTE — PROGRESS NOTES
Subjective:     Encounter Date:12/19/2023      Patient ID: Nhan James is a 73 y.o. male.    Chief Complaint:follow up CAD  History of Present Illness  This is a 72 y/o man who follows with Dr. Gray and is new to me today. He has a pmhx of hypertension, GERD, and coronary artery disease.    He is here today for a follow up visit. He continues to feel good. He goes to the gym 4-5 days a week, walks about 3-4 miles a day and plays golf a couple of times a week when weather permits. He denies any chest pain similar to what he experienced prior to having his stent placed. He denies shortness of breath, palpitations, dizziness or syncope. He has a little swelling in his ankles around his sock line in the evening and this resolves once he elevates his feet.    Prior history:  Dr. Gray began seeing the patient in 2015 for near syncope. He had an echocardiogram that was unremarkable. A stress test was ordered but never completed at that time. He was lost to follow up until he presented in May 2022 with chest pain and bilateral arm numbness. He took an aspirin and sat down which resulted in improvement in his discomfort. Dr. Gray recommended proceeding with a cardiac catheterization. This showed 50% stenosis of his mid left circumflex artery, mild nonobstructive disease of the LAD, 80 and 95% serial mid to distal right coronary artery stenosis status post drug-eluting stent placement with a sign Skypoint 3.0 x 38 mm stent (postdilated with 3.5 mm noncompliant balloon), and a 70 to 80% stenosis of a small caliber mid posterior descending branch.     He followed up with PRITI Rivas and was doing well. He was started on Repatha due to a history of statin intolerance. Unfortunately, he was unable to afford so he was switched to ezetimibe.    He was last seen by Dr. Gray in June 2023 and was continuing to feel well. It was decided at that visit that he could stop clopidogrel and continue aspirin  only.    I have reviewed and updated as appropriate allergies, current medications, past family history, past medical history, past surgical history and problem list.    Review of Systems   Constitutional: Negative for fever, malaise/fatigue, weight gain and weight loss.   HENT:  Negative for congestion, hoarse voice and sore throat.    Eyes:  Negative for blurred vision and double vision.   Cardiovascular:  Negative for chest pain, dyspnea on exertion, leg swelling, orthopnea, palpitations and syncope.   Respiratory:  Negative for cough, shortness of breath and wheezing.    Gastrointestinal:  Negative for abdominal pain, hematemesis, hematochezia and melena.   Genitourinary:  Negative for dysuria and hematuria.   Neurological:  Negative for dizziness, headaches, light-headedness and numbness.   Psychiatric/Behavioral:  Negative for depression. The patient is not nervous/anxious.          Current Outpatient Medications:     ALPRAZolam (XANAX) 1 MG tablet, Take 0.5 tablets by mouth every night at bedtime., Disp: , Rfl:     aspirin 81 MG chewable tablet, Chew 1 tablet Daily., Disp: 30 tablet, Rfl: 11    Barberry-Oreg Grape-Goldenseal (BERBERINE COMPLEX PO), Take  by mouth., Disp: , Rfl:     Biotin 10 MG capsule, Take  by mouth., Disp: , Rfl:     clopidogrel (PLAVIX) 75 MG tablet, Take 1 tablet by mouth Daily., Disp: 30 tablet, Rfl: 11    coenzyme Q10 100 MG capsule, Take 1 capsule by mouth Daily., Disp: , Rfl:     Evolocumab (REPATHA) solution auto-injector SureClick injection, Inject 1 mL under the skin into the appropriate area as directed Every 14 (Fourteen) Days., Disp: 2 pen, Rfl: 11    ezetimibe (ZETIA) 10 MG tablet, Take 1 tablet by mouth Daily., Disp: 90 tablet, Rfl: 3    L-ARGININE PO, Take  by mouth., Disp: , Rfl:     lisinopril (PRINIVIL,ZESTRIL) 10 MG tablet, TAKE 1/2 TO 1 (ONE-HALF TO ONE) TABLET BY MOUTH ONCE DAILY FOR BLOOD PRESSURE GREATER THAN 140, Disp: , Rfl:     Magnesium Gluconate (MAGNESIUM 27  PO), Take  by mouth., Disp: , Rfl:     metoprolol succinate XL (TOPROL-XL) 25 MG 24 hr tablet, Take 0.5 tablets by mouth Daily., Disp: 30 tablet, Rfl: 11    multivitamin-iron-minerals-folic acid (CENTRUM) chewable tablet, Chew 1 tablet Daily., Disp: , Rfl:     nitroglycerin (NITROSTAT) 0.4 MG SL tablet, Place 1 tablet under the tongue Every 5 (Five) Minutes As Needed for Chest Pain (Only if SBP Greater Than 100). Take no more than 3 doses in 15 minutes., Disp: 25 tablet, Rfl: 3    NON FORMULARY, Tri-chromium, Disp: , Rfl:     Omega-3 Fatty Acids (OMEGA-3 2100 PO), Take  by mouth., Disp: , Rfl:     pantoprazole (PROTONIX) 40 MG EC tablet, Take 1 tablet by mouth Every Morning., Disp: 30 tablet, Rfl: 11    ZINC PICOLINATE PO, Take  by mouth., Disp: , Rfl:     Past Medical History:   Diagnosis Date    CAD (coronary artery disease)     Diabetes mellitus     History of hepatitis     Hyperlipidemia     Hypertension     IVAN (obstructive sleep apnea)     PONV (postoperative nausea and vomiting)        Past Surgical History:   Procedure Laterality Date    APPENDECTOMY      CARDIAC CATHETERIZATION N/A 5/18/2022    Procedure: Coronary angiography;  Surgeon: Marilyn Gray MD;  Location: [facility] CATH INVASIVE LOCATION;  Service: Cardiovascular;  Laterality: N/A;    CARDIAC CATHETERIZATION N/A 5/18/2022    Procedure: Left heart cath;  Surgeon: Marilyn Gray MD;  Location: [facility] CATH INVASIVE LOCATION;  Service: Cardiovascular;  Laterality: N/A;    CARDIAC CATHETERIZATION N/A 5/18/2022    Procedure: Left ventriculography;  Surgeon: Marilyn Gray MD;  Location: [facility] CATH INVASIVE LOCATION;  Service: Cardiovascular;  Laterality: N/A;    CARDIAC CATHETERIZATION N/A 5/18/2022    Procedure: Stent KEMAL coronary;  Surgeon: Marilyn Gray MD;  Location: [facility] CATH INVASIVE LOCATION;  Service: Cardiovascular;  Laterality: N/A;    CHOLECYSTECTOMY      FINGER SURGERY      TONSILLECTOMY AND ADENOIDECTOMY         Family History  "  Problem Relation Age of Onset    Heart disease Mother     Diabetes Mother     Diabetes Father     Hypertension Father     Heart disease Father     Diabetes Brother     Hypertension Brother     Heart disease Brother     Hypertension Brother     Heart disease Brother     Heart disease Brother     Diabetes Maternal Grandmother     Hypertension Maternal Grandfather     Stroke Maternal Grandfather     Cerebral aneurysm Paternal Aunt     Cancer Paternal Aunt     Cancer Paternal Uncle        Social History     Tobacco Use    Smoking status: Former     Types: Cigarettes     Quit date:      Years since quittin.9    Smokeless tobacco: Never    Tobacco comments:     caffeine use   Vaping Use    Vaping Use: Never used   Substance Use Topics    Alcohol use: Yes     Comment: occ    Drug use: Never         ECG 12 Lead    Date/Time: 2023 2:24 PM  Performed by: Nidia Herndon APRN    Authorized by: Nidia Herndon APRN  Comparison: compared with previous ECG from 2023  Similar to previous ECG  Rhythm: sinus rhythm             Objective:     Visit Vitals  /80 (BP Location: Left arm, Patient Position: Sitting)   Pulse 56   Ht 172.7 cm (68\")   Wt 86.6 kg (191 lb)   SpO2 98%   BMI 29.04 kg/m²             Physical Exam  Constitutional:       Appearance: Normal appearance. He is normal weight.   HENT:      Head: Normocephalic.   Neck:      Vascular: No carotid bruit.   Cardiovascular:      Rate and Rhythm: Normal rate and regular rhythm.      Chest Wall: PMI is not displaced.      Pulses: Normal pulses.           Radial pulses are 2+ on the right side and 2+ on the left side.        Posterior tibial pulses are 2+ on the right side and 2+ on the left side.      Heart sounds: Normal heart sounds. No murmur heard.     No friction rub. No gallop.   Pulmonary:      Effort: Pulmonary effort is normal.      Breath sounds: Normal breath sounds.   Abdominal:      General: Bowel sounds are normal. There is no distension. "      Palpations: Abdomen is soft.   Musculoskeletal:      Right lower leg: No edema.      Left lower leg: No edema.   Skin:     General: Skin is warm and dry.      Capillary Refill: Capillary refill takes less than 2 seconds.   Neurological:      Mental Status: He is alert and oriented to person, place, and time.   Psychiatric:         Mood and Affect: Mood normal.         Behavior: Behavior normal.         Thought Content: Thought content normal.          Lab Review:         Cardiac Procedures:       Assessment:         Diagnoses and all orders for this visit:    1. Bradycardia, sinus (Primary)    2. Coronary artery disease involving native coronary artery of native heart without angina pectoris    3. S/P right coronary artery (RCA) stent placement    4. Unstable angina    5. Primary hypertension            Plan:       CAD:s/p PCI with stent to mid to distal RCA in 2022. On aspirin, beta blocker and Zetia. Unable to tolerate statin. EKG is stable and no anginal symptoms reported. Continue with current treatment plan.  HTN: blood pressure well controlled. No changes  HLD: intolerant to statin. Unable to afford Repatha. On Zetia. His PCP checks his lipid panel about every 3 months and he is due for a repeat soon. No changes at this time. Goal LDL < 70  Diabetes  IVAN    Thank you for allowing me to participate in this patient's care. Please call with any questions or concerns. Mr. James will follow up with Dr. Gray in 6 months.          Your medication list            Accurate as of December 19, 2023 10:46 AM. If you have any questions, ask your nurse or doctor.                CONTINUE taking these medications        Instructions Last Dose Given Next Dose Due   ALPRAZolam 1 MG tablet  Commonly known as: XANAX      Take 0.5 tablets by mouth every night at bedtime.       aspirin 81 MG chewable tablet      Chew 1 tablet Daily.       BERBERINE COMPLEX PO      Take  by mouth.       Biotin 10 MG capsule      Take  by  mouth.       clopidogrel 75 MG tablet  Commonly known as: PLAVIX      Take 1 tablet by mouth Daily.       coenzyme Q10 100 MG capsule      Take 1 capsule by mouth Daily.       Evolocumab solution auto-injector SureClick injection  Commonly known as: REPATHA      Inject 1 mL under the skin into the appropriate area as directed Every 14 (Fourteen) Days.       ezetimibe 10 MG tablet  Commonly known as: ZETIA      Take 1 tablet by mouth Daily.       L-ARGININE PO      Take  by mouth.       lisinopril 10 MG tablet  Commonly known as: PRINIVIL,ZESTRIL      TAKE 1/2 TO 1 (ONE-HALF TO ONE) TABLET BY MOUTH ONCE DAILY FOR BLOOD PRESSURE GREATER THAN 140       MAGNESIUM 27 PO      Take  by mouth.       metoprolol succinate XL 25 MG 24 hr tablet  Commonly known as: TOPROL-XL      Take 0.5 tablets by mouth Daily.       multivitamin-iron-minerals-folic acid chewable tablet      Chew 1 tablet Daily.       nitroglycerin 0.4 MG SL tablet  Commonly known as: NITROSTAT      Place 1 tablet under the tongue Every 5 (Five) Minutes As Needed for Chest Pain (Only if SBP Greater Than 100). Take no more than 3 doses in 15 minutes.       NON FORMULARY      Tri-chromium       OMEGA-3 2100 PO      Take  by mouth.       pantoprazole 40 MG EC tablet  Commonly known as: PROTONIX      Take 1 tablet by mouth Every Morning.       ZINC PICOLINATE PO      Take  by mouth.                  Nidia Herndon, APRN  12/19/23  10:46 AM EST

## 2024-06-03 ENCOUNTER — LAB (OUTPATIENT)
Dept: LAB | Facility: HOSPITAL | Age: 74
End: 2024-06-03
Payer: MEDICARE

## 2024-06-03 ENCOUNTER — TRANSCRIBE ORDERS (OUTPATIENT)
Dept: LAB | Facility: HOSPITAL | Age: 74
End: 2024-06-03
Payer: MEDICARE

## 2024-06-03 DIAGNOSIS — R53.83 TIREDNESS: ICD-10-CM

## 2024-06-03 DIAGNOSIS — E55.9 VITAMIN D DEFICIENCY: ICD-10-CM

## 2024-06-03 DIAGNOSIS — E78.5 HYPERLIPIDEMIA, UNSPECIFIED HYPERLIPIDEMIA TYPE: Primary | ICD-10-CM

## 2024-06-03 DIAGNOSIS — E11.9 DIABETES MELLITUS WITHOUT COMPLICATION: ICD-10-CM

## 2024-06-03 DIAGNOSIS — I10 HYPERTENSION, ESSENTIAL: ICD-10-CM

## 2024-06-03 DIAGNOSIS — E78.5 HYPERLIPIDEMIA, UNSPECIFIED HYPERLIPIDEMIA TYPE: ICD-10-CM

## 2024-06-03 DIAGNOSIS — Z79.899 ENCOUNTER FOR LONG-TERM (CURRENT) USE OF OTHER MEDICATIONS: ICD-10-CM

## 2024-06-03 DIAGNOSIS — Z12.5 SPECIAL SCREENING FOR MALIGNANT NEOPLASM OF PROSTATE: ICD-10-CM

## 2024-06-03 DIAGNOSIS — K21.9 GASTRIC REFLUX: ICD-10-CM

## 2024-06-03 DIAGNOSIS — I25.10 DISEASE OF CARDIOVASCULAR SYSTEM: ICD-10-CM

## 2024-06-03 LAB
25(OH)D3 SERPL-MCNC: 43.5 NG/ML (ref 30–100)
ALBUMIN SERPL-MCNC: 4.1 G/DL (ref 3.5–5.2)
ALBUMIN UR-MCNC: <1.2 MG/DL
ALBUMIN/GLOB SERPL: 1.5 G/DL
ALP SERPL-CCNC: 74 U/L (ref 39–117)
ALT SERPL W P-5'-P-CCNC: 14 U/L (ref 1–41)
ANION GAP SERPL CALCULATED.3IONS-SCNC: 10.2 MMOL/L (ref 5–15)
AST SERPL-CCNC: 12 U/L (ref 1–40)
BASOPHILS # BLD AUTO: 0.04 10*3/MM3 (ref 0–0.2)
BASOPHILS NFR BLD AUTO: 0.7 % (ref 0–1.5)
BILIRUB SERPL-MCNC: 0.5 MG/DL (ref 0–1.2)
BUN SERPL-MCNC: 12 MG/DL (ref 8–23)
BUN/CREAT SERPL: 10.5 (ref 7–25)
CALCIUM SPEC-SCNC: 9.3 MG/DL (ref 8.6–10.5)
CHLORIDE SERPL-SCNC: 101 MMOL/L (ref 98–107)
CHOLEST SERPL-MCNC: 157 MG/DL (ref 0–200)
CO2 SERPL-SCNC: 25.8 MMOL/L (ref 22–29)
CREAT SERPL-MCNC: 1.14 MG/DL (ref 0.76–1.27)
CREAT UR-MCNC: 70.6 MG/DL
DEPRECATED RDW RBC AUTO: 42.3 FL (ref 37–54)
EGFRCR SERPLBLD CKD-EPI 2021: 67.5 ML/MIN/1.73
EOSINOPHIL # BLD AUTO: 0.28 10*3/MM3 (ref 0–0.4)
EOSINOPHIL NFR BLD AUTO: 4.7 % (ref 0.3–6.2)
ERYTHROCYTE [DISTWIDTH] IN BLOOD BY AUTOMATED COUNT: 12.5 % (ref 12.3–15.4)
GLOBULIN UR ELPH-MCNC: 2.7 GM/DL
GLUCOSE SERPL-MCNC: 203 MG/DL (ref 65–99)
HBA1C MFR BLD: 7.7 % (ref 4.8–5.6)
HCT VFR BLD AUTO: 40.4 % (ref 37.5–51)
HDLC SERPL-MCNC: 36 MG/DL (ref 40–60)
HGB BLD-MCNC: 13.7 G/DL (ref 13–17.7)
IMM GRANULOCYTES # BLD AUTO: 0.01 10*3/MM3 (ref 0–0.05)
IMM GRANULOCYTES NFR BLD AUTO: 0.2 % (ref 0–0.5)
LDLC SERPL CALC-MCNC: 86 MG/DL (ref 0–100)
LDLC/HDLC SERPL: 2.23 {RATIO}
LYMPHOCYTES # BLD AUTO: 1.37 10*3/MM3 (ref 0.7–3.1)
LYMPHOCYTES NFR BLD AUTO: 22.9 % (ref 19.6–45.3)
MCH RBC QN AUTO: 31.3 PG (ref 26.6–33)
MCHC RBC AUTO-ENTMCNC: 33.9 G/DL (ref 31.5–35.7)
MCV RBC AUTO: 92.2 FL (ref 79–97)
MICROALBUMIN/CREAT UR: NORMAL MG/G{CREAT}
MONOCYTES # BLD AUTO: 0.37 10*3/MM3 (ref 0.1–0.9)
MONOCYTES NFR BLD AUTO: 6.2 % (ref 5–12)
NEUTROPHILS NFR BLD AUTO: 3.92 10*3/MM3 (ref 1.7–7)
NEUTROPHILS NFR BLD AUTO: 65.3 % (ref 42.7–76)
PLATELET # BLD AUTO: 233 10*3/MM3 (ref 140–450)
PMV BLD AUTO: 10.9 FL (ref 6–12)
POTASSIUM SERPL-SCNC: 4.3 MMOL/L (ref 3.5–5.2)
PROT SERPL-MCNC: 6.8 G/DL (ref 6–8.5)
PSA SERPL-MCNC: 1.04 NG/ML (ref 0–4)
RBC # BLD AUTO: 4.38 10*6/MM3 (ref 4.14–5.8)
SODIUM SERPL-SCNC: 137 MMOL/L (ref 136–145)
TRIGL SERPL-MCNC: 203 MG/DL (ref 0–150)
TSH SERPL DL<=0.05 MIU/L-ACNC: 1.15 UIU/ML (ref 0.27–4.2)
VIT B12 BLD-MCNC: 1831 PG/ML (ref 211–946)
VLDLC SERPL-MCNC: 35 MG/DL (ref 5–40)
WBC NRBC COR # BLD AUTO: 5.99 10*3/MM3 (ref 3.4–10.8)

## 2024-06-03 PROCEDURE — 83036 HEMOGLOBIN GLYCOSYLATED A1C: CPT

## 2024-06-03 PROCEDURE — G0103 PSA SCREENING: HCPCS

## 2024-06-03 PROCEDURE — 80061 LIPID PANEL: CPT

## 2024-06-03 PROCEDURE — 84443 ASSAY THYROID STIM HORMONE: CPT

## 2024-06-03 PROCEDURE — 82306 VITAMIN D 25 HYDROXY: CPT

## 2024-06-03 PROCEDURE — 82570 ASSAY OF URINE CREATININE: CPT

## 2024-06-03 PROCEDURE — 82607 VITAMIN B-12: CPT

## 2024-06-03 PROCEDURE — 85025 COMPLETE CBC W/AUTO DIFF WBC: CPT

## 2024-06-03 PROCEDURE — 80053 COMPREHEN METABOLIC PANEL: CPT

## 2024-06-03 PROCEDURE — 82043 UR ALBUMIN QUANTITATIVE: CPT

## 2024-06-03 PROCEDURE — 36415 COLL VENOUS BLD VENIPUNCTURE: CPT

## 2024-06-05 RX ORDER — ASPIRIN 81 MG/1
81 TABLET, CHEWABLE ORAL DAILY
Qty: 30 TABLET | Refills: 11 | Status: SHIPPED | OUTPATIENT
Start: 2024-06-05

## 2024-06-20 NOTE — PROGRESS NOTES
Subjective:     Encounter Date:06/21/2024      Patient ID: Nhan James is a 74 y.o. male.    Chief Complaint:  History of Present Illness    This is a 74-year-old with hypertension, acid reflux, coronary artery disease status post drug-eluting stent placement of the right coronary artery, hyperlipidemia with statin intolerance, who presents for follow-up.     In 6/2023 the patient reported an episode of atypical pain that sounded musculoskeletal.  He indicated his preference to stop the clopidogrel.  After long discussion we decided to stop it.    He was seen in follow-up in 12/2023 by PRITI Owen.  He was doing well and exercising on regular basis.  No changes were made to his management.    He presents back today for follow-up.  He reports that his blood sugars have been doing as well so he was placed on metformin.  He was unable to tolerate taking it on a daily basis and now takes it every other day.  For some reason the patient states he has not been taking the ezetimibe as we previously that we had started.    He denies any chest pain, shortness of breath, palpitations, orthopnea, near-syncope or syncope or lower extremity swelling.  He reported isolated episode of lightheadedness.    Prior History:  I saw the patient in the past in 10/2015 for complaints of near syncope.  He had undergone an echocardiogram prior to that office visit that was unremarkable.  I recommended proceeding with a stress test because of symptoms as diaphoresis associated with his near-syncope.  Testing was never completed.      I did not see him again until 5/2022 when he presented with complaints of chest pain.  He reported an episode 4 days prior of midsternal chest pain that radiated across his chest to his bilateral upper arms and was associated with bilateral arm numbness.  He denied any associated nausea, vomiting, diaphoresis, or shortness of breath.  As soon as the symptoms started he turned right back around  and went back to his house.  Once he got home he took a aspirin and sat down.  After resting for a few minutes the symptoms resolved.  He did report a significant family history of coronary artery disease including his father who had his first myocardial infarction at the age of 53 and his brother who had a CABG at the age of 52.     Based on his symptoms concerning for unstable angina I recommended proceeding directly with a cardiac catheterization.  This was performed on 5/18/2022.  Coronary angiograms revealed 50% stenosis of his mid left circumflex artery, mild nonobstructive disease of the LAD, 80 and 95% serial mid to distal right coronary artery stenosis status post drug-eluting stent placement with a sign Skypoint 3.0 x 38 mm stent (postdilated with 3.5 mm noncompliant balloon), and a 70 to 80% stenosis of a small caliber mid posterior descending branch.     In follow-up he was seen by PRITI Rivas on 6/3/2022 at which time he denies any further symptoms.  He was started on Repatha due to history of statin intolerance.  He was then seen back in follow-up by PRITI Rivas on 7/28/2022 at which time he continued to be doing well however he was unable to afford the Repatha.  At the time he was switched to ezetimibe.         Review of Systems   Constitutional: Negative for malaise/fatigue.   HENT:  Negative for hearing loss, hoarse voice, nosebleeds and sore throat.    Eyes:  Negative for pain.   Cardiovascular:  Negative for chest pain, claudication, cyanosis, dyspnea on exertion, irregular heartbeat, leg swelling, near-syncope, orthopnea, palpitations, paroxysmal nocturnal dyspnea and syncope.   Respiratory:  Negative for shortness of breath and snoring.    Endocrine: Negative for cold intolerance, heat intolerance, polydipsia, polyphagia and polyuria.   Skin:  Negative for itching and rash.   Musculoskeletal:  Negative for arthritis, falls, joint pain, joint swelling, muscle cramps, muscle weakness  and myalgias.   Gastrointestinal:  Negative for constipation, diarrhea, dysphagia, heartburn, hematemesis, hematochezia, melena, nausea and vomiting.   Genitourinary:  Negative for frequency, hematuria and hesitancy.   Neurological:  Positive for light-headedness. Negative for excessive daytime sleepiness, dizziness, headaches, numbness and weakness.   Psychiatric/Behavioral:  Negative for depression. The patient is not nervous/anxious.          Current Outpatient Medications:     ALPRAZolam (XANAX) 1 MG tablet, Take 0.5 tablets by mouth every night at bedtime., Disp: , Rfl:     aspirin 81 MG chewable tablet, CHEW AND SWALLOW 1 TABLET BY MOUTH DAILY, Disp: 30 tablet, Rfl: 11    Barberry-Oreg Grape-Goldenseal (BERBERINE COMPLEX PO), Take  by mouth., Disp: , Rfl:     Biotin 10 MG capsule, Take  by mouth., Disp: , Rfl:     coenzyme Q10 100 MG capsule, Take 1 capsule by mouth Daily., Disp: , Rfl:     L-ARGININE PO, Take  by mouth., Disp: , Rfl:     lisinopril (PRINIVIL,ZESTRIL) 10 MG tablet, TAKE 1/2 TO 1 (ONE-HALF TO ONE) TABLET BY MOUTH ONCE DAILY FOR BLOOD PRESSURE GREATER THAN 140, Disp: , Rfl:     Magnesium Gluconate (MAGNESIUM 27 PO), Take  by mouth., Disp: , Rfl:     metFORMIN ER (GLUCOPHAGE-XR) 500 MG 24 hr tablet, TAKE 1 TABLET BY MOUTH DAILY FOR BLOOD SUGAR, Disp: , Rfl:     metoprolol succinate XL (TOPROL-XL) 25 MG 24 hr tablet, Take 0.5 tablets by mouth Daily., Disp: 30 tablet, Rfl: 11    multivitamin-iron-minerals-folic acid (CENTRUM) chewable tablet, Chew 1 tablet Daily., Disp: , Rfl:     nitroglycerin (NITROSTAT) 0.4 MG SL tablet, Place 1 tablet under the tongue Every 5 (Five) Minutes As Needed for Chest Pain (Only if SBP Greater Than 100). Take no more than 3 doses in 15 minutes., Disp: 25 tablet, Rfl: 3    NON FORMULARY, Tri-chromium, Disp: , Rfl:     Omega-3 Fatty Acids (OMEGA-3 2100 PO), Take  by mouth., Disp: , Rfl:     omeprazole (priLOSEC) 40 MG capsule, TAKE 1 CAPSULE BY MOUTH EVERY DAY ON AN  EMPTY STOMACH FOR STOMACH ACID, Disp: , Rfl:     ZINC PICOLINATE PO, Take  by mouth., Disp: , Rfl:     ezetimibe (Zetia) 10 MG tablet, Take 0.5 tablets by mouth Daily., Disp: 30 tablet, Rfl: 5    Past Medical History:   Diagnosis Date    CAD (coronary artery disease)     Diabetes mellitus     History of hepatitis     Hyperlipidemia     Hypertension     IVAN (obstructive sleep apnea)     PONV (postoperative nausea and vomiting)        Past Surgical History:   Procedure Laterality Date    APPENDECTOMY      CARDIAC CATHETERIZATION N/A 5/18/2022    Procedure: Coronary angiography;  Surgeon: Marilyn Gray MD;  Location:  [facility] CATH INVASIVE LOCATION;  Service: Cardiovascular;  Laterality: N/A;    CARDIAC CATHETERIZATION N/A 5/18/2022    Procedure: Left heart cath;  Surgeon: Marilyn Gray MD;  Location:  [facility] CATH INVASIVE LOCATION;  Service: Cardiovascular;  Laterality: N/A;    CARDIAC CATHETERIZATION N/A 5/18/2022    Procedure: Left ventriculography;  Surgeon: Marilyn Gray MD;  Location:  [facility] CATH INVASIVE LOCATION;  Service: Cardiovascular;  Laterality: N/A;    CARDIAC CATHETERIZATION N/A 5/18/2022    Procedure: Stent KEMAL coronary;  Surgeon: Marilyn Gray MD;  Location:  [facility] CATH INVASIVE LOCATION;  Service: Cardiovascular;  Laterality: N/A;    CHOLECYSTECTOMY      FINGER SURGERY      TONSILLECTOMY AND ADENOIDECTOMY         Family History   Problem Relation Age of Onset    Heart disease Mother     Diabetes Mother     Diabetes Father     Hypertension Father     Heart disease Father     Diabetes Brother     Hypertension Brother     Heart disease Brother     Hypertension Brother     Heart disease Brother     Heart disease Brother     Diabetes Maternal Grandmother     Hypertension Maternal Grandfather     Stroke Maternal Grandfather     Cerebral aneurysm Paternal Aunt     Cancer Paternal Aunt     Cancer Paternal Uncle        Social History     Tobacco Use    Smoking status: Former     Current packs/day:  "0.00     Types: Cigarettes     Quit date:      Years since quittin.4    Smokeless tobacco: Never    Tobacco comments:     caffeine use   Vaping Use    Vaping status: Never Used   Substance Use Topics    Alcohol use: Yes     Comment: occ    Drug use: Never         ECG 12 Lead    Date/Time: 2024 12:21 PM  Performed by: Marilyn Gray MD    Authorized by: Marilyn Gray MD  Comparison: compared with previous ECG   Rhythm: sinus rhythm             Objective:     Visit Vitals  /70 (BP Location: Left arm, Patient Position: Sitting, Cuff Size: Adult)   Pulse 57   Ht 172.7 cm (68\")   Wt 87.2 kg (192 lb 3.2 oz)   SpO2 99%   BMI 29.22 kg/m²         Constitutional:       Appearance: Normal appearance. Well-developed.   HENT:      Head: Normocephalic and atraumatic.   Neck:      Vascular: No carotid bruit or JVD.   Pulmonary:      Effort: Pulmonary effort is normal.      Breath sounds: Normal breath sounds.   Cardiovascular:      Normal rate. Regular rhythm.      No gallop.    Pulses:     Radial: 2+ bilaterally.  Edema:     Peripheral edema absent.   Abdominal:      Palpations: Abdomen is soft.   Skin:     General: Skin is warm and dry.   Neurological:      Mental Status: Alert and oriented to person, place, and time.             Assessment:          Diagnosis Plan   1. Coronary artery disease involving native coronary artery of native heart without angina pectoris        2. Primary hypertension        3. S/P right coronary artery (RCA) stent placement        4. IVAN on CPAP               Plan:       1.  Coronary artery disease.  Appears to be stable and asymptomatic.  EKG shows no changes.  Continue current medical management.  2.  Hypertension.  Well-controlled on current regimen medications.  Continue the same.  3.  Hyperlipidemia.  Statin intolerant.  Unable to afford a PCSK9 inhibitor.  His LDL tends to run in the 80s to 90s.  Previously recommended ezetimibe but does not appear that the patient ever " received or started this for some reason.  Will start him on 5 mg daily.  If he develops any issues with this to I told him to discontinue it.  4.  Obstructive sleep apnea  5.  Diabetes mellitus type 2.  Started on metformin.  Patient is working on his diet.    Will plan on seeing the patient back again in 6 months.

## 2024-06-21 ENCOUNTER — OFFICE VISIT (OUTPATIENT)
Age: 74
End: 2024-06-21
Payer: MEDICARE

## 2024-06-21 VITALS
HEART RATE: 57 BPM | HEIGHT: 68 IN | WEIGHT: 192.2 LBS | OXYGEN SATURATION: 99 % | BODY MASS INDEX: 29.13 KG/M2 | SYSTOLIC BLOOD PRESSURE: 130 MMHG | DIASTOLIC BLOOD PRESSURE: 70 MMHG

## 2024-06-21 DIAGNOSIS — I10 PRIMARY HYPERTENSION: ICD-10-CM

## 2024-06-21 DIAGNOSIS — G47.33 OSA ON CPAP: ICD-10-CM

## 2024-06-21 DIAGNOSIS — Z95.5 S/P RIGHT CORONARY ARTERY (RCA) STENT PLACEMENT: ICD-10-CM

## 2024-06-21 DIAGNOSIS — I25.10 CORONARY ARTERY DISEASE INVOLVING NATIVE CORONARY ARTERY OF NATIVE HEART WITHOUT ANGINA PECTORIS: Primary | ICD-10-CM

## 2024-06-21 DIAGNOSIS — E78.5 HYPERLIPIDEMIA LDL GOAL <70: ICD-10-CM

## 2024-06-21 PROBLEM — I20.0 UNSTABLE ANGINA: Status: RESOLVED | Noted: 2022-05-13 | Resolved: 2024-06-21

## 2024-06-21 PROCEDURE — 1159F MED LIST DOCD IN RCRD: CPT | Performed by: INTERNAL MEDICINE

## 2024-06-21 PROCEDURE — 1160F RVW MEDS BY RX/DR IN RCRD: CPT | Performed by: INTERNAL MEDICINE

## 2024-06-21 PROCEDURE — 93000 ELECTROCARDIOGRAM COMPLETE: CPT | Performed by: INTERNAL MEDICINE

## 2024-06-21 PROCEDURE — 3078F DIAST BP <80 MM HG: CPT | Performed by: INTERNAL MEDICINE

## 2024-06-21 PROCEDURE — 3075F SYST BP GE 130 - 139MM HG: CPT | Performed by: INTERNAL MEDICINE

## 2024-06-21 PROCEDURE — 99214 OFFICE O/P EST MOD 30 MIN: CPT | Performed by: INTERNAL MEDICINE

## 2024-06-21 RX ORDER — METFORMIN HYDROCHLORIDE 500 MG/1
TABLET, EXTENDED RELEASE ORAL
COMMUNITY
Start: 2024-06-05

## 2024-06-21 RX ORDER — OMEPRAZOLE 40 MG/1
CAPSULE, DELAYED RELEASE ORAL
COMMUNITY
Start: 2024-06-05

## 2024-06-21 RX ORDER — EZETIMIBE 10 MG/1
5 TABLET ORAL DAILY
Qty: 30 TABLET | Refills: 5 | Status: SHIPPED | OUTPATIENT
Start: 2024-06-21

## 2024-09-09 ENCOUNTER — LAB (OUTPATIENT)
Dept: LAB | Facility: HOSPITAL | Age: 74
End: 2024-09-09
Payer: MEDICARE

## 2024-09-09 ENCOUNTER — TRANSCRIBE ORDERS (OUTPATIENT)
Dept: LAB | Facility: HOSPITAL | Age: 74
End: 2024-09-09
Payer: MEDICARE

## 2024-09-09 DIAGNOSIS — E11.9 DIABETES MELLITUS WITHOUT COMPLICATION: Primary | ICD-10-CM

## 2024-09-09 DIAGNOSIS — Z79.899 ENCOUNTER FOR LONG-TERM (CURRENT) USE OF OTHER MEDICATIONS: ICD-10-CM

## 2024-09-09 DIAGNOSIS — E11.9 DIABETES MELLITUS WITHOUT COMPLICATION: ICD-10-CM

## 2024-09-09 LAB — HBA1C MFR BLD: 7.3 % (ref 4.8–5.6)

## 2024-09-09 PROCEDURE — 36415 COLL VENOUS BLD VENIPUNCTURE: CPT

## 2024-09-09 PROCEDURE — 83036 HEMOGLOBIN GLYCOSYLATED A1C: CPT

## 2024-10-31 NOTE — PROGRESS NOTES
Chief Complaint  Diabetes (Follow up, med mgt, A1c eval)    Referred By: No ref. provider found    Subjective          Nhan James presents to [facility] for diabetes medication management    History of Present Illness    Visit type:  follow-up  Diabetes type:  Type 2  Current diabetes status/concerns/issues:  Reports his PCP placed him on metformin 500mg qd but he reports he was lightheaded/dizzy at times so he started taking it qod. Denies feeling lightdeeaded or dizzy since switching to qod. States he currently does not check his glucose. States he currently does not own a meter.   Other health concerns: No new health concerns  Current Diabetes symptoms:    Polyuria: No   Polydipsia: No   Polyphagia: No   Blurred vision: No   Excessive fatigue: No  Known Diabetes complications:  Neuropathy: None; Location: N/A  Renal: Stage II mild (GFR = 60-89mL/min)  Eyes: None; Location: N/A-appt next wk with   Amputation/Wounds: None  GI: Reflux  Cardiovascular: Hypertension, Hyperlipidemia, Hypercholesterolemia, Hypertriglyceridemia, CAD and MI (History of)  ED: None  Other: None  Hypoglycemia:  None reported at this time  Hypoglycemia Symptoms:  No hypoglycemia at this time  Current diabetes treatment:   Cinnamon, burbarine , metformin 500mg qod  Blood glucose device:  Not currently monitoring BG  Blood glucose monitoring frequency:  Not currently monitoring BG  Blood glucose range/average:  Not currently monitoring BG  Glucose Source: N/A  Dietary behavior:  Number of meals each day - 3; Number of snacks each day - 1, History of Diabetes Nutrition Counseling - YES, Reports he is eating high carbs but decreasing his sugar content. He has reduced his sodas to once per wk insted of 2-3 daily. He also cut sweet snack cakes  Activity/Exercise: he plays golf and goes to the gym 3-4 times wk.     Past Medical History:   Diagnosis Date    CAD (coronary artery disease)      Diabetes mellitus     History of hepatitis     Hyperlipidemia     Hypertension     IVAN (obstructive sleep apnea)     PONV (postoperative nausea and vomiting)      Past Surgical History:   Procedure Laterality Date    APPENDECTOMY      CARDIAC CATHETERIZATION N/A 2022    Procedure: Coronary angiography;  Surgeon: Marilyn Gray MD;  Location:  [facility];  Service: Cardiovascular;  Laterality: N/A;    CARDIAC CATHETERIZATION N/A 2022    Procedure: Left heart cath;  Surgeon: Marilyn Gray MD;  Location:  [facility];  Service: Cardiovascular;  Laterality: N/A;    CARDIAC CATHETERIZATION N/A 2022    Procedure: Left ventriculography;  Surgeon: Marilyn Gray MD;  Location:  [facility];  Service: Cardiovascular;  Laterality: N/A;    CARDIAC CATHETERIZATION N/A 2022    Procedure: Stent KEMAL coronary;  Surgeon: Marilyn Gray MD;  Location:  [facility];  Service: Cardiovascular;  Laterality: N/A;    CHOLECYSTECTOMY      FINGER SURGERY      TONSILLECTOMY AND ADENOIDECTOMY       Family History   Problem Relation Age of Onset    Heart disease Mother     Diabetes Mother     Diabetes Father     Hypertension Father     Heart disease Father     Diabetes Brother     Hypertension Brother     Heart disease Brother     Hypertension Brother     Heart disease Brother     Heart disease Brother     Diabetes Maternal Grandmother     Hypertension Maternal Grandfather     Stroke Maternal Grandfather     Cerebral aneurysm Paternal Aunt     Cancer Paternal Aunt     Cancer Paternal Uncle      Social History     Socioeconomic History    Marital status:     Number of children: 2   Tobacco Use    Smoking status: Former     Current packs/day: 0.00     Types: Cigarettes     Quit date:      Years since quittin.8    Smokeless tobacco: Never    Tobacco comments:     caffeine use   Vaping Use    Vaping status: Never Used   Substance and  Sexual Activity    Alcohol use: Yes     Comment: occ    Drug use: Never     No Known Allergies    Current Outpatient Medications:     ALPRAZolam (XANAX) 1 MG tablet, Take 0.5 tablets by mouth every night at bedtime., Disp: , Rfl:     aspirin 81 MG chewable tablet, CHEW AND SWALLOW 1 TABLET BY MOUTH DAILY, Disp: 30 tablet, Rfl: 11    Barberry-Oreg Grape-Goldenseal (BERBERINE COMPLEX PO), Take  by mouth., Disp: , Rfl:     Biotin 10 MG capsule, Take  by mouth., Disp: , Rfl:     coenzyme Q10 100 MG capsule, Take 1 capsule by mouth Daily., Disp: , Rfl:     L-ARGININE PO, Take  by mouth., Disp: , Rfl:     lisinopril (PRINIVIL,ZESTRIL) 10 MG tablet, TAKE 1/2 TO 1 (ONE-HALF TO ONE) TABLET BY MOUTH ONCE DAILY FOR BLOOD PRESSURE GREATER THAN 140, Disp: , Rfl:     Magnesium Gluconate (MAGNESIUM 27 PO), Take  by mouth., Disp: , Rfl:     nitroglycerin (NITROSTAT) 0.4 MG SL tablet, Place 1 tablet under the tongue Every 5 (Five) Minutes As Needed for Chest Pain (Only if SBP Greater Than 100). Take no more than 3 doses in 15 minutes., Disp: 25 tablet, Rfl: 3    Omega-3 Fatty Acids (OMEGA-3 2100 PO), Take  by mouth., Disp: , Rfl:     omeprazole (priLOSEC) 40 MG capsule, TAKE 1 CAPSULE BY MOUTH EVERY DAY ON AN EMPTY STOMACH FOR STOMACH ACID, Disp: , Rfl:     ZINC PICOLINATE PO, Take  by mouth., Disp: , Rfl:     Blood Glucose Monitoring Suppl device, Use as directed for blood glucose monitoring, Disp: 1 each, Rfl: 0    empagliflozin (Jardiance) 10 MG tablet tablet, Take 1 tablet by mouth Daily for 180 days., Disp: 90 tablet, Rfl: 1    ezetimibe (Zetia) 10 MG tablet, Take 0.5 tablets by mouth Daily. (Patient not taking: Reported on 11/1/2024), Disp: 30 tablet, Rfl: 5    glucose blood test strip, Test blood glucose 1 times each day, Disp: 100 each, Rfl: 5    Lancets misc, Test blood glucose 1 times each day, Disp: 100 each, Rfl: 5    Objective     Vitals:    11/01/24 1328   Pulse: 70   SpO2: 97%   Weight: 87.1 kg (192 lb)   Height:  "172.7 cm (68\")   PainSc: 0-No pain     Body mass index is 29.19 kg/m².    Physical Exam  Constitutional:       Appearance: Normal appearance. He is normal weight.      Comments: Overweight (BMI 25 - 29.9) Pt Current BMI = 28.19     HENT:      Head: Normocephalic and atraumatic.      Right Ear: External ear normal.      Left Ear: External ear normal.      Nose: Nose normal.   Eyes:      Extraocular Movements: Extraocular movements intact.      Conjunctiva/sclera: Conjunctivae normal.   Pulmonary:      Effort: Pulmonary effort is normal.   Musculoskeletal:         General: Normal range of motion.      Cervical back: Normal range of motion.   Skin:     General: Skin is warm and dry.   Neurological:      General: No focal deficit present.      Mental Status: He is alert and oriented to person, place, and time. Mental status is at baseline.   Psychiatric:         Mood and Affect: Mood normal.         Behavior: Behavior normal.         Thought Content: Thought content normal.         Judgment: Judgment normal.             Result Review :   The following data was reviewed by: PRITI Horn on 11/01/2024:    Most Recent A1C          11/1/2024    13:44   HGBA1C Most Recent   Hemoglobin A1C 7.2        A1C Last 3 Results          6/3/2024    10:18 9/9/2024    10:50 11/1/2024    13:44   HGBA1C Last 3 Results   Hemoglobin A1C 7.70  7.30  7.2      A1c collected in the office today is 7.2%, indicating Uncontrolled Type II diabetes.  This result is down from the prior result of 7.3% collected on 9/9/2024        Creatinine   Date Value Ref Range Status   06/03/2024 1.14 0.76 - 1.27 mg/dL Final   05/19/2022 0.97 0.76 - 1.27 mg/dL Final     eGFR   Date Value Ref Range Status   06/03/2024 67.5 >60.0 mL/min/1.73 Final   05/19/2022 83.5 >60.0 mL/min/1.73 Final     Comment:     National Kidney Foundation and American Society of Nephrology (ASN) Task Force recommended calculation based on the Chronic Kidney Disease " Epidemiology Collaboration (CKD-EPI) equation refit without adjustment for race.     Labs collected on 6/30/2024 show Stage II mild (GFR = 60-89mL/min)    Microalbumin, Urine   Date Value Ref Range Status   06/03/2024 <1.2 mg/dL Final     Creatinine, Urine   Date Value Ref Range Status   06/03/2024 70.6 mg/dL Final     Microalbumin/Creatinine Ratio   Date Value Ref Range Status   06/03/2024   Final     Comment:     Unable to calculate   04/22/2021 4.8 0.0 - 25.0 mg/g[Cre] Final     Urine microalbuminuria collected on 6/3/2024 is negative for microalbuminuria    Total Cholesterol   Date Value Ref Range Status   06/03/2024 157 0 - 200 mg/dL Final   05/19/2022 158 0 - 200 mg/dL Final     Triglycerides   Date Value Ref Range Status   06/03/2024 203 (H) 0 - 150 mg/dL Final   05/19/2022 194 (H) 0 - 150 mg/dL Final     HDL Cholesterol   Date Value Ref Range Status   06/03/2024 36 (L) 40 - 60 mg/dL Final   05/19/2022 30 (L) 40 - 60 mg/dL Final     LDL Cholesterol    Date Value Ref Range Status   06/03/2024 86 0 - 100 mg/dL Final   05/19/2022 94 0 - 100 mg/dL Final     Lipid panel collected on 6/3/2024 shows Hypertriglyceridemia and low HDL            Assessment: Patient is here today to follow-up on his diabetes.  His last visit was 5/30/2023.  He was on cinnamon and berberine last visit and he reports he is now on metformin 500 mg every other day.  He reports his PCP wrote for the metformin daily but he reports dizziness and lightheaded nests when he takes it every day.  Reports he does not like taking the metformin due to the side effects that he is read.. Reports he has not been checking his glucose levels as he does not own a meter.  His A1c in the office today is 7.2% which is slight creatinine was previous A1c of 7.3% in September.      Diagnoses and all orders for this visit:    1. Type 2 diabetes mellitus with hyperglycemia, unspecified whether long term insulin use (Primary)  -     POC Glycosylated Hemoglobin (Hb  A1C)  -     empagliflozin (Jardiance) 10 MG tablet tablet; Take 1 tablet by mouth Daily for 180 days.  Dispense: 90 tablet; Refill: 1  -     Blood Glucose Monitoring Suppl device; Use as directed for blood glucose monitoring  Dispense: 1 each; Refill: 0  -     Lancets misc; Test blood glucose 1 times each day  Dispense: 100 each; Refill: 5  -     glucose blood test strip; Test blood glucose 1 times each day  Dispense: 100 each; Refill: 5    2. Overweight (BMI 25.0-29.9)    3. Type 2 diabetes mellitus with stage 2 chronic kidney disease, without long-term current use of insulin        Plan: Discontinued metformin and prescribed Jardiance 10 mg once daily.  Instructed patient drink plenty water with this medication to avoid dehydration, yeast infections and UTIs.  Instructed patient to contact the office if he develops any symptoms.  Ordered a glucometer and supplies.  Scheduled a 3-month follow-up and we will recheck his A1c at that time.    The patient will monitor his blood glucose levels once daily.  If he develops problematic hyperglycemia or hypoglycemia or adverse drug reactions, he will contact the office for further instructions.        Follow Up     Return in about 3 months (around 2/1/2025) for Medication Mgmt.    Patient was given instructions and counseling regarding his condition or for health maintenance advice. Please see specific information pulled into the AVS if appropriate.     Deborah Justin, APRN  11/01/2024      Dictated Utilizing Dragon Dictation.  Please note that portions of this note were completed with a voice recognition program.  Part of this note may be an electronic transcription/translation of spoken language to printed text using the Dragon Dictation System.

## 2024-11-01 ENCOUNTER — OFFICE VISIT (OUTPATIENT)
Dept: DIABETES SERVICES | Facility: CLINIC | Age: 74
End: 2024-11-01
Payer: MEDICARE

## 2024-11-01 VITALS — WEIGHT: 192 LBS | BODY MASS INDEX: 29.1 KG/M2 | HEIGHT: 68 IN | HEART RATE: 70 BPM | OXYGEN SATURATION: 97 %

## 2024-11-01 DIAGNOSIS — E11.22 TYPE 2 DIABETES MELLITUS WITH STAGE 2 CHRONIC KIDNEY DISEASE, WITHOUT LONG-TERM CURRENT USE OF INSULIN: ICD-10-CM

## 2024-11-01 DIAGNOSIS — E66.3 OVERWEIGHT (BMI 25.0-29.9): ICD-10-CM

## 2024-11-01 DIAGNOSIS — E11.65 TYPE 2 DIABETES MELLITUS WITH HYPERGLYCEMIA, UNSPECIFIED WHETHER LONG TERM INSULIN USE: Primary | ICD-10-CM

## 2024-11-01 DIAGNOSIS — N18.2 TYPE 2 DIABETES MELLITUS WITH STAGE 2 CHRONIC KIDNEY DISEASE, WITHOUT LONG-TERM CURRENT USE OF INSULIN: ICD-10-CM

## 2024-11-01 LAB
EXPIRATION DATE: ABNORMAL
HBA1C MFR BLD: 7.2 % (ref 4.5–5.7)
Lab: ABNORMAL

## 2024-11-01 PROCEDURE — 1159F MED LIST DOCD IN RCRD: CPT | Performed by: NURSE PRACTITIONER

## 2024-11-01 PROCEDURE — 1160F RVW MEDS BY RX/DR IN RCRD: CPT | Performed by: NURSE PRACTITIONER

## 2024-11-01 PROCEDURE — 3051F HG A1C>EQUAL 7.0%<8.0%: CPT | Performed by: NURSE PRACTITIONER

## 2024-11-01 PROCEDURE — 99214 OFFICE O/P EST MOD 30 MIN: CPT | Performed by: NURSE PRACTITIONER

## 2024-11-01 RX ORDER — LANCETS 30 GAUGE
EACH MISCELLANEOUS
Qty: 100 EACH | Refills: 5 | Status: SHIPPED | OUTPATIENT
Start: 2024-11-01

## 2024-11-01 NOTE — PATIENT INSTRUCTIONS
Start Jardiance 10 mg once daily.  Make sure to drink plenty of water with this medication to avoid dehydration, yeast infections and UTIs.  If you develop any the symptoms please contact our office.    Stop metformin

## 2024-12-30 ENCOUNTER — OFFICE VISIT (OUTPATIENT)
Dept: CARDIOLOGY | Facility: CLINIC | Age: 74
End: 2024-12-30
Payer: MEDICARE

## 2024-12-30 VITALS
HEART RATE: 58 BPM | SYSTOLIC BLOOD PRESSURE: 134 MMHG | BODY MASS INDEX: 29.1 KG/M2 | HEIGHT: 68 IN | WEIGHT: 192 LBS | DIASTOLIC BLOOD PRESSURE: 74 MMHG

## 2024-12-30 DIAGNOSIS — I25.10 CORONARY ARTERY DISEASE INVOLVING NATIVE CORONARY ARTERY OF NATIVE HEART WITHOUT ANGINA PECTORIS: ICD-10-CM

## 2024-12-30 DIAGNOSIS — E78.5 HYPERLIPIDEMIA LDL GOAL <70: ICD-10-CM

## 2024-12-30 DIAGNOSIS — I10 PRIMARY HYPERTENSION: ICD-10-CM

## 2024-12-30 DIAGNOSIS — Z95.5 S/P RIGHT CORONARY ARTERY (RCA) STENT PLACEMENT: Primary | ICD-10-CM

## 2024-12-30 NOTE — PROGRESS NOTES
Subjective:     Encounter Date:12/30/2024      Patient ID: Nhan James is a 74 y.o. male.    Chief Complaint:follow up CAD  History of Present Illness  This is a 75 y/o man who follows with Dr. Gray and is known to me. He has a pmhx of hypertension, GERD, and coronary artery disease.    He is here today for a follow-up visit.  He continues to do well from a cardiac standpoint.  He has not had any complaints of chest pain, shortness of breath, palpitations, dizziness or syncope.  No swelling in his legs, orthopnea or PND.  He remains pretty active.  He still golfs frequently and denies any concerning symptoms when he is playing golf.  Blood pressure is well-controlled.  He is back on the [facility].    Prior history:  Dr. Gray began seeing the patient in 2015 for near syncope. He had an echocardiogram that was unremarkable. A stress test was ordered but never completed at that time. He was lost to follow up until he presented in May 2022 with chest pain and bilateral arm numbness. He took an aspirin and sat down which resulted in improvement in his discomfort. Dr. Gray recommended proceeding with a cardiac catheterization. This showed 50% stenosis of his mid left circumflex artery, mild nonobstructive disease of the LAD, 80 and 95% serial mid to distal right coronary artery stenosis status post drug-eluting stent placement with a sign Skypoint 3.0 x 38 mm stent (postdilated with 3.5 mm noncompliant balloon), and a 70 to 80% stenosis of a small caliber mid posterior descending branch.     He followed up with PRITI Rivas and was doing well. He was started on Repatha due to a history of statin intolerance. Unfortunately, he was unable to afford so he was switched to ezetimibe.    He was last seen by Dr. Gray in June 2023 and was continuing to feel well. It was decided at that visit that he could stop clopidogrel and continue aspirin only.    I saw him in December 2023 and he continued to do well and  was exercising on a regular basis.  No changes were made.    He then followed up with Dr. Gray in June and reported his blood sugars have been elevated and he had been started on metformin.  Unfortunately he was not tolerating it well and they have changed it to taking every other day.  He had also reported that he was no longer taking Zetia.  I have reviewed and updated as appropriate allergies, current medications, past family history, past medical history, past surgical history and problem list.    Review of Systems   Constitutional: Negative for fever, malaise/fatigue, weight gain and weight loss.   HENT:  Negative for congestion, hoarse voice and sore throat.    Eyes:  Negative for blurred vision and double vision.   Cardiovascular:  Negative for chest pain, dyspnea on exertion, leg swelling, orthopnea, palpitations and syncope.   Respiratory:  Negative for cough, shortness of breath and wheezing.    Gastrointestinal:  Negative for abdominal pain, hematemesis, hematochezia and melena.   Genitourinary:  Negative for dysuria and hematuria.   Neurological:  Negative for dizziness, headaches, light-headedness and numbness.   Psychiatric/Behavioral:  Negative for depression. The patient is not nervous/anxious.          Current Outpatient Medications:     ALPRAZolam (XANAX) 1 MG tablet, Take 0.5 tablets by mouth every night at bedtime., Disp: , Rfl:     aspirin 81 MG chewable tablet, CHEW AND SWALLOW 1 TABLET BY MOUTH DAILY, Disp: 30 tablet, Rfl: 11    Barberry-Oreg Grape-Goldenseal (BERBERINE COMPLEX PO), Take  by mouth., Disp: , Rfl:     Biotin 10 MG capsule, Take  by mouth., Disp: , Rfl:     Blood Glucose Monitoring Suppl device, Use as directed for blood glucose monitoring, Disp: 1 each, Rfl: 0    coenzyme Q10 100 MG capsule, Take 1 capsule by mouth Daily., Disp: , Rfl:     empagliflozin (Jardiance) 10 MG tablet tablet, Take 1 tablet by mouth Daily for 180 days., Disp: 90 tablet, Rfl: 1    ezetimibe (Zetia) 10  MG tablet, Take 0.5 tablets by mouth Daily., Disp: 30 tablet, Rfl: 5    glucose blood test strip, Test blood glucose 1 times each day, Disp: 100 each, Rfl: 5    L-ARGININE PO, Take  by mouth., Disp: , Rfl:     Lancets misc, Test blood glucose 1 times each day, Disp: 100 each, Rfl: 5    lisinopril (PRINIVIL,ZESTRIL) 10 MG tablet, TAKE 1/2 TO 1 (ONE-HALF TO ONE) TABLET BY MOUTH ONCE DAILY FOR BLOOD PRESSURE GREATER THAN 140, Disp: , Rfl:     Magnesium Gluconate (MAGNESIUM 27 PO), Take  by mouth., Disp: , Rfl:     nitroglycerin (NITROSTAT) 0.4 MG SL tablet, Place 1 tablet under the tongue Every 5 (Five) Minutes As Needed for Chest Pain (Only if SBP Greater Than 100). Take no more than 3 doses in 15 minutes., Disp: 25 tablet, Rfl: 3    Omega-3 Fatty Acids (OMEGA-3 2100 PO), Take  by mouth., Disp: , Rfl:     omeprazole (priLOSEC) 40 MG capsule, TAKE 1 CAPSULE BY MOUTH EVERY DAY ON AN EMPTY STOMACH FOR STOMACH ACID, Disp: , Rfl:     ZINC PICOLINATE PO, Take  by mouth., Disp: , Rfl:     Past Medical History:   Diagnosis Date    CAD (coronary artery disease)     Diabetes mellitus     History of hepatitis     Hyperlipidemia     Hypertension     IVAN (obstructive sleep apnea)     PONV (postoperative nausea and vomiting)        Past Surgical History:   Procedure Laterality Date    APPENDECTOMY      CARDIAC CATHETERIZATION N/A 5/18/2022    Procedure: Coronary angiography;  Surgeon: Marilyn Gray MD;  Location: [facility] INVASIVE LOCATION;  Service: Cardiovascular;  Laterality: N/A;    CARDIAC CATHETERIZATION N/A 5/18/2022    Procedure: Left heart cath;  Surgeon: Marilyn Gray MD;  Location: [facility]ATH INVASIVE LOCATION;  Service: Cardiovascular;  Laterality: N/A;    CARDIAC CATHETERIZATION N/A 5/18/2022    Procedure: Left ventriculography;  Surgeon: Marilyn Gray MD;  Location: [facility] INVASIVE LOCATION;  Service: Cardiovascular;  Laterality: N/A;    CARDIAC CATHETERIZATION N/A 5/18/2022    Procedure: Stent KEMAL  "coronary;  Surgeon: Marilyn Gray MD;  Location: [facility] INVASIVE LOCATION;  Service: Cardiovascular;  Laterality: N/A;    CHOLECYSTECTOMY      FINGER SURGERY      TONSILLECTOMY AND ADENOIDECTOMY         Family History   Problem Relation Age of Onset    Heart disease Mother     Diabetes Mother     Diabetes Father     Hypertension Father     Heart disease Father     Diabetes Brother     Hypertension Brother     Heart disease Brother     Hypertension Brother     Heart disease Brother     Heart disease Brother     Diabetes Maternal Grandmother     Hypertension Maternal Grandfather     Stroke Maternal Grandfather     Cerebral aneurysm Paternal Aunt     Cancer Paternal Aunt     Cancer Paternal Uncle        Social History     Tobacco Use    Smoking status: Former     Current packs/day: 0.00     Types: Cigarettes     Quit date:      Years since quittin.0    Smokeless tobacco: Never    Tobacco comments:     caffeine use   Vaping Use    Vaping status: Never Used   Substance Use Topics    Alcohol use: Yes     Comment: occ    Drug use: Never         ECG 12 Lead    Date/Time: 2024 2:56 PM  Performed by: Nidia Herndon APRN    Authorized by: Nidia Herndon APRN  Comparison: compared with previous ECG from 2024  Similar to previous ECG  Rhythm: sinus rhythm             Objective:     Visit Vitals  /74 (BP Location: Right arm, Patient Position: Sitting, Cuff Size: Adult)   Pulse 58   Ht 172.7 cm (68\")   Wt 87.1 kg (192 lb)   BMI 29.19 kg/m²             Physical Exam  Constitutional:       Appearance: Normal appearance. He is normal weight.   HENT:      Head: Normocephalic.   Neck:      Vascular: No carotid bruit.   Cardiovascular:      Rate and Rhythm: Normal rate and regular rhythm.      Chest Wall: PMI is not displaced.      Pulses: Normal pulses.           Radial pulses are 2+ on the right side and 2+ on the left side.        Posterior tibial pulses are 2+ on the right side and 2+ on the left " side.      Heart sounds: Normal heart sounds. No murmur heard.     No friction rub. No gallop.   Pulmonary:      Effort: Pulmonary effort is normal.      Breath sounds: Normal breath sounds.   Abdominal:      General: Bowel sounds are normal. There is no distension.      Palpations: Abdomen is soft.   Musculoskeletal:      Right lower leg: No edema.      Left lower leg: No edema.   Skin:     General: Skin is warm and dry.      Capillary Refill: Capillary refill takes less than 2 seconds.   Neurological:      Mental Status: He is alert and oriented to person, place, and time.   Psychiatric:         Mood and Affect: Mood normal.         Behavior: Behavior normal.         Thought Content: Thought content normal.          Lab Review:   Lipid Panel          6/3/2024    10:18   Lipid Panel   Total Cholesterol 157    Triglycerides 203    HDL Cholesterol 36    VLDL Cholesterol 35    LDL Cholesterol  86    LDL/HDL Ratio 2.23          Cardiac Procedures:       Assessment:         Diagnoses and all orders for this visit:    1. S/P right coronary artery (RCA) stent placement (Primary)    2. Primary hypertension    3. Hyperlipidemia LDL goal <70    4. Coronary artery disease involving native coronary artery of native heart without angina pectoris    Other orders  -     ECG 12 Lead              Plan:       CAD:s/p PCI with stent to mid to distal RCA in 2022. On aspirin, beta blocker and Zetia. Unable to tolerate statin. EKG is stable and no anginal symptoms reported. Continue with current treatment plan.  HTN: blood pressure well controlled. No changes  HLD: intolerant to statin. Unable to afford Repatha. On Zetia.  Most recent LDL was 86 in June 2024 goal LDL < 70  Diabetes  IVAN    Thank you for allowing me to participate in this patient's care. Please call with any questions or concerns. Mr. James will follow up with Dr. Gray in 6 months.          Your medication list            Accurate as of December 30, 2024  2:59 PM. If  you have any questions, ask your nurse or doctor.                CONTINUE taking these medications        Instructions Last Dose Given Next Dose Due   ALPRAZolam 1 MG tablet  Commonly known as: XANAX      Take 0.5 tablets by mouth every night at bedtime.       aspirin 81 MG chewable tablet      CHEW AND SWALLOW 1 TABLET BY MOUTH DAILY       BERBERINE COMPLEX PO      Take  by mouth.       Biotin 10 MG capsule      Take  by mouth.       Blood Glucose Monitoring Suppl device      Use as directed for blood glucose monitoring       coenzyme Q10 100 MG capsule      Take 1 capsule by mouth Daily.       empagliflozin 10 MG tablet tablet  Commonly known as: Jardiance      Take 1 tablet by mouth Daily for 180 days.       ezetimibe 10 MG tablet  Commonly known as: Zetia      Take 0.5 tablets by mouth Daily.       glucose blood test strip      Test blood glucose 1 times each day       L-ARGININE PO      Take  by mouth.       Lancets misc      Test blood glucose 1 times each day       lisinopril 10 MG tablet  Commonly known as: PRINIVIL,ZESTRIL      TAKE 1/2 TO 1 (ONE-HALF TO ONE) TABLET BY MOUTH ONCE DAILY FOR BLOOD PRESSURE GREATER THAN 140       MAGNESIUM 27 PO      Take  by mouth.       nitroglycerin 0.4 MG SL tablet  Commonly known as: NITROSTAT      Place 1 tablet under the tongue Every 5 (Five) Minutes As Needed for Chest Pain (Only if SBP Greater Than 100). Take no more than 3 doses in 15 minutes.       OMEGA-3 2100 PO      Take  by mouth.       omeprazole 40 MG capsule  Commonly known as: priLOSEC      TAKE 1 CAPSULE BY MOUTH EVERY DAY ON AN EMPTY STOMACH FOR STOMACH ACID       ZINC PICOLINATE PO      Take  by mouth.                  PRITI Mondragon  12/30/24  10:46 AM EST

## 2025-01-17 ENCOUNTER — OFFICE VISIT (OUTPATIENT)
Dept: DIABETES SERVICES | Facility: CLINIC | Age: 75
End: 2025-01-17
Payer: MEDICARE

## 2025-01-17 VITALS
OXYGEN SATURATION: 98 % | HEART RATE: 80 BPM | SYSTOLIC BLOOD PRESSURE: 151 MMHG | WEIGHT: 191 LBS | DIASTOLIC BLOOD PRESSURE: 58 MMHG | HEIGHT: 68 IN | BODY MASS INDEX: 28.95 KG/M2

## 2025-01-17 DIAGNOSIS — E66.3 OVERWEIGHT (BMI 25.0-29.9): ICD-10-CM

## 2025-01-17 DIAGNOSIS — N18.2 TYPE 2 DIABETES MELLITUS WITH STAGE 2 CHRONIC KIDNEY DISEASE, WITHOUT LONG-TERM CURRENT USE OF INSULIN: ICD-10-CM

## 2025-01-17 DIAGNOSIS — E11.22 TYPE 2 DIABETES MELLITUS WITH STAGE 2 CHRONIC KIDNEY DISEASE, WITHOUT LONG-TERM CURRENT USE OF INSULIN: ICD-10-CM

## 2025-01-17 DIAGNOSIS — E11.65 TYPE 2 DIABETES MELLITUS WITH HYPERGLYCEMIA, UNSPECIFIED WHETHER LONG TERM INSULIN USE: Primary | ICD-10-CM

## 2025-01-17 LAB
EXPIRATION DATE: ABNORMAL
HBA1C MFR BLD: 8.2 % (ref 4.5–5.7)
Lab: ABNORMAL

## 2025-01-17 PROCEDURE — 1160F RVW MEDS BY RX/DR IN RCRD: CPT | Performed by: NURSE PRACTITIONER

## 2025-01-17 PROCEDURE — 3077F SYST BP >= 140 MM HG: CPT | Performed by: NURSE PRACTITIONER

## 2025-01-17 PROCEDURE — 1159F MED LIST DOCD IN RCRD: CPT | Performed by: NURSE PRACTITIONER

## 2025-01-17 PROCEDURE — 3078F DIAST BP <80 MM HG: CPT | Performed by: NURSE PRACTITIONER

## 2025-01-17 PROCEDURE — 99214 OFFICE O/P EST MOD 30 MIN: CPT | Performed by: NURSE PRACTITIONER

## 2025-01-17 PROCEDURE — 3052F HG A1C>EQUAL 8.0%<EQUAL 9.0%: CPT | Performed by: NURSE PRACTITIONER

## 2025-01-17 RX ORDER — METFORMIN HYDROCHLORIDE 500 MG/1
500 TABLET, EXTENDED RELEASE ORAL
COMMUNITY

## 2025-01-17 NOTE — PROGRESS NOTES
Chief Complaint  Diabetes (Follow up, med mgt, A1c eval)    Referred By: No ref. provider found    Patient or patient representative verbalized consent for the use of Ambient Listening during the visit with  PRITI Horn for chart documentation. 1/17/2025  10:53 EST    Subjective          Nhan Yovani James presents to [facility] for diabetes medication management    History of Present Illness    History of Present Illness  The patient is a 74-year-old male who presents today to follow up on his diabetes.    He reports that Jardiance was too expensive for him, costing $ 600 per month, while the generic version was even more expensive at $ 1000. He has not yet tried Farxiga. His blood glucose levels have been around 130 during the last two checks, which he conducts bi-weekly. He reports no gastrointestinal issues, frequent thirst or urination, persistent hunger, blurred vision, fatigue, or hypoglycemic episodes. He continues to take cinnamon and berberine supplements. He monitors his blood glucose levels 2 to 3 times per week and after consuming different foods. He maintains a regular exercise routine, engaging in physical activity 4 to 5 days per week. He has been on a regimen of metformin extended release 500 mg daily since 01/01/2025, which he tolerates well. Prior to [place], he was taking the medication every other day but increased the frequency to daily after noticing elevated blood glucose levels. He reports no new health concerns and has not experienced any episodes of hypoglycemia. He recalls experiencing lightheadedness and dizziness during a previous course of metformin, but these symptoms have not recurred.    FAMILY HISTORY  His brother had a kidney transplant.    MEDICATIONS  Current: Metformin, cinnamon, berberine.           Visit type:  follow-up  Diabetes type:  Type 2  Current diabetes status/concerns/issues: Reports she did not  the  Jardiance due to cost.   Other health concerns: No new health concerns  Current Diabetes symptoms:    Polyuria: No   Polydipsia: No   Polyphagia: No   Blurred vision: No   Excessive fatigue: No  Known Diabetes complications:  Neuropathy: None; Location: N/A  Renal: Stage II mild (GFR = 60-89mL/min)  Eyes: None; Location: N/A-appt next wk with   Amputation/Wounds: None  GI: Reflux  Cardiovascular: Hypertension, Hyperlipidemia, Hypercholesterolemia, Hypertriglyceridemia, CAD and MI (History of)  ED: None  Other: None  Hypoglycemia:  None reported at this time  Hypoglycemia Symptoms:  No hypoglycemia at this time  Current diabetes treatment:    Cinnamon, burbarine , metformin xr 500mg qd  Blood glucose device:  Meter  Blood glucose monitoring frequency:   2-3 week  Blood glucose range/average:   130 average  Glucose Source: Patient Reported  Dietary behavior:  Number of meals each day - 3; Number of snacks each day - 1, History of Diabetes Nutrition Counseling - YES, Reports he is eating high carbs but decreasing his sugar content. He has reduced his sodas to once per wk insted of 2-3 daily. He also cut sweet snack cakes  Activity/Exercise: he plays golf and goes to the gym 3-4 times wk.     Past Medical History:   Diagnosis Date    CAD (coronary artery disease)     Diabetes mellitus     History of hepatitis     Hyperlipidemia     Hypertension     IVAN (obstructive sleep apnea)     PONV (postoperative nausea and vomiting)      Past Surgical History:   Procedure Laterality Date    APPENDECTOMY      CARDIAC CATHETERIZATION N/A 5/18/2022    Procedure: Coronary angiography;  Surgeon: Marilyn Gray MD;  Location:  INVASIVE LOCATION;  Service: Cardiovascular;  Laterality: N/A;    CARDIAC CATHETERIZATION N/A 5/18/2022    Procedure: Left heart cath;  Surgeon: Marilyn Gray MD;  Location: [facility] CATH INVASIVE LOCATION;  Service: Cardiovascular;  Laterality: N/A;    CARDIAC CATHETERIZATION N/A  2022    Procedure: Left ventriculography;  Surgeon: Marilyn Gray MD;  Location:  [facility] CATH INVASIVE LOCATION;  Service: Cardiovascular;  Laterality: N/A;    CARDIAC CATHETERIZATION N/A 2022    Procedure: Stent KEMAL coronary;  Surgeon: Marilyn Gray MD;  Location:  [facility] CATH INVASIVE LOCATION;  Service: Cardiovascular;  Laterality: N/A;    CHOLECYSTECTOMY      FINGER SURGERY      TONSILLECTOMY AND ADENOIDECTOMY       Family History   Problem Relation Age of Onset    Heart disease Mother     Diabetes Mother     Diabetes Father     Hypertension Father     Heart disease Father     Diabetes Brother     Hypertension Brother     Heart disease Brother     Hypertension Brother     Heart disease Brother     Heart disease Brother     Diabetes Maternal Grandmother     Hypertension Maternal Grandfather     Stroke Maternal Grandfather     Cerebral aneurysm Paternal Aunt     Cancer Paternal Aunt     Cancer Paternal Uncle      Social History     Socioeconomic History    Marital status:     Number of children: 2   Tobacco Use    Smoking status: Former     Current packs/day: 0.00     Types: Cigarettes     Quit date:      Years since quittin.0    Smokeless tobacco: Never    Tobacco comments:     caffeine use   Vaping Use    Vaping status: Never Used   Substance and Sexual Activity    Alcohol use: Yes     Comment: occ    Drug use: Never     No Known Allergies    Current Outpatient Medications:     ALPRAZolam (XANAX) 1 MG tablet, Take 0.5 tablets by mouth every night at bedtime., Disp: , Rfl:     aspirin 81 MG chewable tablet, CHEW AND SWALLOW 1 TABLET BY MOUTH DAILY, Disp: 30 tablet, Rfl: 11    Barberry-Oreg Grape-Goldenseal (BERBERINE COMPLEX PO), Take  by mouth., Disp: , Rfl:     Biotin 10 MG capsule, Take  by mouth., Disp: , Rfl:     Blood Glucose Monitoring Suppl device, Use as directed for blood glucose monitoring, Disp: 1 each, Rfl: 0    coenzyme Q10 100 MG capsule, Take 1 capsule by mouth  "Daily., Disp: , Rfl:     ezetimibe (Zetia) 10 MG tablet, Take 0.5 tablets by mouth Daily., Disp: 30 tablet, Rfl: 5    glucose blood test strip, Test blood glucose 1 times each day, Disp: 100 each, Rfl: 5    L-ARGININE PO, Take  by mouth., Disp: , Rfl:     Lancets misc, Test blood glucose 1 times each day, Disp: 100 each, Rfl: 5    lisinopril (PRINIVIL,ZESTRIL) 10 MG tablet, TAKE 1/2 TO 1 (ONE-HALF TO ONE) TABLET BY MOUTH ONCE DAILY FOR BLOOD PRESSURE GREATER THAN 140, Disp: , Rfl:     Magnesium Gluconate (MAGNESIUM 27 PO), Take  by mouth., Disp: , Rfl:     metFORMIN ER (GLUCOPHAGE-XR) 500 MG 24 hr tablet, Take 1 tablet by mouth Daily With Breakfast., Disp: , Rfl:     nitroglycerin (NITROSTAT) 0.4 MG SL tablet, Place 1 tablet under the tongue Every 5 (Five) Minutes As Needed for Chest Pain (Only if SBP Greater Than 100). Take no more than 3 doses in 15 minutes., Disp: 25 tablet, Rfl: 3    Omega-3 Fatty Acids (OMEGA-3 2100 PO), Take  by mouth., Disp: , Rfl:     omeprazole (priLOSEC) 40 MG capsule, TAKE 1 CAPSULE BY MOUTH EVERY DAY ON AN EMPTY STOMACH FOR STOMACH ACID, Disp: , Rfl:     ZINC PICOLINATE PO, Take  by mouth., Disp: , Rfl:     empagliflozin (Jardiance) 10 MG tablet tablet, Take 1 tablet by mouth Daily for 180 days. (Patient not taking: Reported on 1/17/2025), Disp: 90 tablet, Rfl: 1    Objective     Vitals:    01/17/25 1020   BP: 151/58   Pulse: 80   SpO2: 98%   Weight: 86.6 kg (191 lb)   Height: 172.7 cm (68\")   PainSc: 0-No pain     Body mass index is 29.04 kg/m².    Physical Exam  Constitutional:       Appearance: Normal appearance. He is normal weight.      Comments: Overweight (BMI 25 - 29.9) Pt Current BMI = 29.04     HENT:      Head: Normocephalic and atraumatic.      Right Ear: External ear normal.      Left Ear: External ear normal.      Nose: Nose normal.   Eyes:      Extraocular Movements: Extraocular movements intact.      Conjunctiva/sclera: Conjunctivae normal.   Pulmonary:      Effort: " Pulmonary effort is normal.   Musculoskeletal:         General: Normal range of motion.      Cervical back: Normal range of motion.   Skin:     General: Skin is warm and dry.   Neurological:      General: No focal deficit present.      Mental Status: He is alert and oriented to person, place, and time. Mental status is at baseline.   Psychiatric:         Mood and Affect: Mood normal.         Behavior: Behavior normal.         Thought Content: Thought content normal.         Judgment: Judgment normal.             Result Review :   The following data was reviewed by: PRITI Horn on 01/17/2025:    Most Recent A1C          1/17/2025    10:34   HGBA1C Most Recent   Hemoglobin A1C 8.2        A1C Last 3 Results          9/9/2024    10:50 11/1/2024    13:44 1/17/2025    10:34   HGBA1C Last 3 Results   Hemoglobin A1C 7.30  7.2  8.2      A1c collected in the office today is 8.2%, indicating Uncontrolled Type II diabetes.  This result is up from the prior result of 7.2% collected on 11/1/24         Creatinine   Date Value Ref Range Status   06/03/2024 1.14 0.76 - 1.27 mg/dL Final   05/19/2022 0.97 0.76 - 1.27 mg/dL Final     eGFR   Date Value Ref Range Status   06/03/2024 67.5 >60.0 mL/min/1.73 Final   05/19/2022 83.5 >60.0 mL/min/1.73 Final     Comment:     National Kidney Foundation and American Society of Nephrology (ASN) Task Force recommended calculation based on the Chronic Kidney Disease Epidemiology Collaboration (CKD-EPI) equation refit without adjustment for race.     Labs collected on 6/3/24 show Stage II mild (GFR = 60-89mL/min)    Microalbumin, Urine   Date Value Ref Range Status   06/03/2024 <1.2 mg/dL Final     Creatinine, Urine   Date Value Ref Range Status   06/03/2024 70.6 mg/dL Final     Microalbumin/Creatinine Ratio   Date Value Ref Range Status   06/03/2024   Final     Comment:     Unable to calculate   04/22/2021 4.8 0.0 - 25.0 mg/g[Cre] Final     Urine microalbuminuria collected on  6/30/2024 is negative for microalbuminuria    Total Cholesterol   Date Value Ref Range Status   06/03/2024 157 0 - 200 mg/dL Final   05/19/2022 158 0 - 200 mg/dL Final     Triglycerides   Date Value Ref Range Status   06/03/2024 203 (H) 0 - 150 mg/dL Final   05/19/2022 194 (H) 0 - 150 mg/dL Final     HDL Cholesterol   Date Value Ref Range Status   06/03/2024 36 (L) 40 - 60 mg/dL Final   05/19/2022 30 (L) 40 - 60 mg/dL Final     LDL Cholesterol    Date Value Ref Range Status   06/03/2024 86 0 - 100 mg/dL Final   05/19/2022 94 0 - 100 mg/dL Final     Lipid panel collected on 6/3/2024 shows Hypertriglyceridemia and low HDL                  Diagnoses and all orders for this visit:    1. Type 2 diabetes mellitus with hyperglycemia, unspecified whether long term insulin use (Primary)  -     POC Glycosylated Hemoglobin (Hb A1C)    2. Overweight (BMI 25.0-29.9)    3. Type 2 diabetes mellitus with stage 2 chronic kidney disease, without long-term current use of insulin        Assessment & Plan  1. Diabetes Mellitus.  His A1c level has increased from 7.2 to 8.2, likely due to dietary indiscretions during the holiday season and inconsistent use of metformin. He has been tolerating metformin well since resuming daily intake on 01/01/2025. His blood glucose levels have been around 130 when checked, which is a positive sign. He is advised to continue monitoring his blood glucose levels 2 to 3 times per week and after consuming different foods. He is also encouraged to maintain his exercise routine of 4 to 5 days a week and to watch his carbohydrate and sugar intake. He will continue with his current regimen of metformin extended release 500 mg daily. The goal is to reduce his A1c to below 7.    Follow-up  The patient is scheduled for a follow-up visit on 04/18/2025 at 10:20 AM.              The patient will monitor his blood glucose levels  2-3 times wk .  If he develops problematic hyperglycemia or hypoglycemia or adverse drug  reactions, he will contact the office for further instructions.        Follow Up     Return in about 3 months (around 4/17/2025) for Medication Mgmt.    Patient was given instructions and counseling regarding his condition or for health maintenance advice. Please see specific information pulled into the AVS if appropriate.     Deborah Justin, APRN  01/17/2025      Dictated Utilizing Dragon Dictation.  Please note that portions of this note were completed with a voice recognition program.  Part of this note may be an electronic transcription/translation of spoken language to printed text using the Dragon Dictation System.

## 2025-04-16 NOTE — PROGRESS NOTES
Chief Complaint  Diabetes (Follow up, med mgt, A1c eval)    Referred By: Bunny Hickey Jr., MD    Patient or patient representative verbalized consent for the use of Ambient Listening during the visit with  PRITI Horn for chart documentation. 4/21/2025  10:45 EDT    Subjective          Nhan James presents to [facility] for diabetes medication management    History of Present Illness    History of Present Illness  The patient is a male who presents for follow-up on his diabetes.    Blood glucose levels are not frequently monitored in the morning, with the most recent reading being 110. No symptoms of polydipsia or polyuria are reported, although a high water intake is maintained. Appetite remains robust without persistent hunger. No visual disturbances or excessive fatigue are noted. No hypoglycemic episodes are reported. Blood glucose levels are monitored once or twice weekly, with readings ranging from 110 to 180. An active lifestyle is maintained, including regular golfing and gym attendance. Dietary modifications are acknowledged as necessary. A recent two-week trip to [place] involved significant carbohydrate consumption. A scheduled appointment with Dr. Hickey next month will include annual urine microalbumin tests. A recent refill of metformin was obtained. Jardiance was discontinued due to high cost. Adherence to the metformin regimen, taking 500 mg daily, is reported, along with continued supplementation with cinnamon and berberine.         Visit type:  follow-up  Diabetes type:  Type 2  Current diabetes status/concerns/issues:  reports he checks his blood sugar 1-2 times weeky and his blood sugar ranges from 110-180mg/dl.    Other health concerns: No new health concerns  Current Diabetes symptoms:    Polyuria: No   Polydipsia: No   Polyphagia: No   Blurred vision: No   Excessive fatigue: No  Known Diabetes complications:  Neuropathy: None; Location:  N/A  Renal: Stage II mild (GFR = 60-89mL/min)  Eyes: None; Location: N/A-appt next wk with   Amputation/Wounds: None  GI: Reflux  Cardiovascular: Hypertension, Hyperlipidemia, Hypercholesterolemia, Hypertriglyceridemia, CAD and MI (History of)  ED: None  Other: None  Hypoglycemia:  None reported at this time  Hypoglycemia Symptoms:  No hypoglycemia at this time  Current diabetes treatment:   Cinnamon, burbarine , metformin xr 500mg qd   Blood glucose device:  Meter  Blood glucose monitoring frequency:   1-2 times wk  Blood glucose range/average:   110-180mg/dl  Glucose Source: Patient Reported  Dietary behavior:  Number of meals each day - 3; Number of snacks each day - 1, History of Diabetes Nutrition Counseling - YES, Reports he is eating high carbs but decreasing his sugar content. He has reduced his sodas to once per wk insted of 2-3 daily. He also cut sweet snack cakes  Activity/Exercise: he plays golf and goes to the gym 3-4 times wk.    Past Medical History:   Diagnosis Date    CAD (coronary artery disease)     Diabetes mellitus     History of hepatitis     Hyperlipidemia     Hypertension     IVAN (obstructive sleep apnea)     PONV (postoperative nausea and vomiting)      Past Surgical History:   Procedure Laterality Date    APPENDECTOMY      CARDIAC CATHETERIZATION N/A 5/18/2022    Procedure: Coronary angiography;  Surgeon: Marilyn Gray MD;  Location: [facility] INVASIVE LOCATION;  Service: Cardiovascular;  Laterality: N/A;    CARDIAC CATHETERIZATION N/A 5/18/2022    Procedure: Left heart cath;  Surgeon: Marilyn Gray MD;  Location: [facility] CATH INVASIVE LOCATION;  Service: Cardiovascular;  Laterality: N/A;    CARDIAC CATHETERIZATION N/A 5/18/2022    Procedure: Left ventriculography;  Surgeon: Marilyn Gray MD;  Location: [facility] CATH INVASIVE LOCATION;  Service: Cardiovascular;  Laterality: N/A;    CARDIAC CATHETERIZATION N/A 5/18/2022    Procedure: Stent KEMAL coronary;  Surgeon: Marina  MD Marilyn;  Location: [facility] INVASIVE LOCATION;  Service: Cardiovascular;  Laterality: N/A;    CHOLECYSTECTOMY      FINGER SURGERY      TONSILLECTOMY AND ADENOIDECTOMY       Family History   Problem Relation Age of Onset    Heart disease Mother     Diabetes Mother     Diabetes Father     Hypertension Father     Heart disease Father     Diabetes Brother     Hypertension Brother     Heart disease Brother     Hypertension Brother     Heart disease Brother     Heart disease Brother     Diabetes Maternal Grandmother     Hypertension Maternal Grandfather     Stroke Maternal Grandfather     Cerebral aneurysm Paternal Aunt     Cancer Paternal Aunt     Cancer Paternal Uncle      Social History     Socioeconomic History    Marital status:     Number of children: 2   Tobacco Use    Smoking status: Former     Current packs/day: 0.00     Types: Cigarettes     Quit date:      Years since quittin.3    Smokeless tobacco: Never    Tobacco comments:     caffeine use   Vaping Use    Vaping status: Never Used   Substance and Sexual Activity    Alcohol use: Yes     Comment: occ    Drug use: Never     No Known Allergies    Current Outpatient Medications:     ALPRAZolam (XANAX) 1 MG tablet, Take 0.5 tablets by mouth every night at bedtime., Disp: , Rfl:     aspirin 81 MG chewable tablet, CHEW AND SWALLOW 1 TABLET BY MOUTH DAILY, Disp: 30 tablet, Rfl: 11    Barberry-Oreg Grape-Goldenseal (BERBERINE COMPLEX PO), Take  by mouth., Disp: , Rfl:     Biotin 10 MG capsule, Take  by mouth., Disp: , Rfl:     Blood Glucose Monitoring Suppl device, Use as directed for blood glucose monitoring, Disp: 1 each, Rfl: 0    coenzyme Q10 100 MG capsule, Take 1 capsule by mouth Daily., Disp: , Rfl:     ezetimibe (Zetia) 10 MG tablet, Take 0.5 tablets by mouth Daily., Disp: 30 tablet, Rfl: 5    glucose blood test strip, Test blood glucose 1 times each day, Disp: 100 each, Rfl: 5    L-ARGININE PO, Take  by mouth., Disp: , Rfl:      "Lancets misc, Test blood glucose 1 times each day, Disp: 100 each, Rfl: 5    lisinopril (PRINIVIL,ZESTRIL) 10 MG tablet, TAKE 1/2 TO 1 (ONE-HALF TO ONE) TABLET BY MOUTH ONCE DAILY FOR BLOOD PRESSURE GREATER THAN 140, Disp: , Rfl:     Magnesium Gluconate (MAGNESIUM 27 PO), Take  by mouth., Disp: , Rfl:     metFORMIN ER (GLUCOPHAGE-XR) 500 MG 24 hr tablet, Take 1 tablet by mouth Daily With Breakfast., Disp: , Rfl:     nitroglycerin (NITROSTAT) 0.4 MG SL tablet, Place 1 tablet under the tongue Every 5 (Five) Minutes As Needed for Chest Pain (Only if SBP Greater Than 100). Take no more than 3 doses in 15 minutes., Disp: 25 tablet, Rfl: 3    Omega-3 Fatty Acids (OMEGA-3 2100 PO), Take  by mouth., Disp: , Rfl:     omeprazole (priLOSEC) 40 MG capsule, TAKE 1 CAPSULE BY MOUTH EVERY DAY ON AN EMPTY STOMACH FOR STOMACH ACID, Disp: , Rfl:     ZINC PICOLINATE PO, Take  by mouth., Disp: , Rfl:     empagliflozin (Jardiance) 10 MG tablet tablet, Take 1 tablet by mouth Daily for 180 days. (Patient not taking: Reported on 4/18/2025), Disp: 90 tablet, Rfl: 1    Objective     Vitals:    04/18/25 1022   BP: 136/66   Pulse: 70   SpO2: 99%   Weight: 85.7 kg (189 lb)   Height: 172.7 cm (68\")   PainSc: 0-No pain     Body mass index is 28.74 kg/m².    Physical Exam  Constitutional:       Appearance: Normal appearance. He is normal weight.      Comments: Overweight (BMI 25 - 29.9) Pt Current BMI = 28.74     HENT:      Head: Normocephalic and atraumatic.      Right Ear: External ear normal.      Left Ear: External ear normal.      Nose: Nose normal.   Eyes:      Extraocular Movements: Extraocular movements intact.      Conjunctiva/sclera: Conjunctivae normal.   Pulmonary:      Effort: Pulmonary effort is normal.   Musculoskeletal:         General: Normal range of motion.      Cervical back: Normal range of motion.   Skin:     General: Skin is warm and dry.   Neurological:      General: No focal deficit present.      Mental Status: He is alert " and oriented to person, place, and time. Mental status is at baseline.   Psychiatric:         Mood and Affect: Mood normal.         Behavior: Behavior normal.         Thought Content: Thought content normal.         Judgment: Judgment normal.             Result Review :   The following data was reviewed by: PRITI Horn on 04/18/2025:    Most Recent A1C          4/18/2025    10:34   HGBA1C Most Recent   Hemoglobin A1C 7.2        A1C Last 3 Results          11/1/2024    13:44 1/17/2025    10:34 4/18/2025    10:34   HGBA1C Last 3 Results   Hemoglobin A1C 7.2  8.2  7.2      A1c collected in the office today is 7.2%, indicating Uncontrolled Type II diabetes.  This result is down from the prior result of 8.2% collected on 1/17/25.                     Diagnoses and all orders for this visit:    1. Type 2 diabetes mellitus with hyperglycemia, unspecified whether long term insulin use (Primary)  -     POC Glycosylated Hemoglobin (Hb A1C)    2. Overweight (BMI 25.0-29.9)    3. Uncontrolled type 2 diabetes mellitus with hyperglycemia          Assessment & Plan  1. Diabetes Mellitus.  - His A1c has improved from 8.2 to 7.2, but the goal is to achieve an A1c of <7.  - He has been consistent with his metformin 500 mg once a day and continues to take cinnamon and berberine. He reports no issues with frequent thirst, frequent urination, blurred vision, or fatigue.  - He checks his blood sugar once or twice a week, with readings ranging from 110 to 180. He will have his urine microalbumin test done by Dr. Hickey next month.  - He is advised to continue his current medication regimen and focus on dietary modifications, particularly reducing intake of white bread, pasta, rice, potatoes, sugary drinks, and snacks. If his A1c remains >7, an increase in metformin dosage may be considered.    Follow-up  The patient will follow up on 08/01/2025 at 10:00 AM.      The patient will monitor his blood glucose levels  1-2 times per  wk .  If he develops problematic hyperglycemia or hypoglycemia or adverse drug reactions, he will contact the office for further instructions.        Follow Up     Return in about 3 months (around 7/18/2025) for Medication Mgmt.    Patient was given instructions and counseling regarding his condition or for health maintenance advice. Please see specific information pulled into the AVS if appropriate.     Deborah Justin, PRITI  04/18/2025      Dictated Utilizing Dragon Dictation.  Please note that portions of this note were completed with a voice recognition program.  Part of this note may be an electronic transcription/translation of spoken language to printed text using the Dragon Dictation System.

## 2025-04-18 ENCOUNTER — OFFICE VISIT (OUTPATIENT)
Dept: DIABETES SERVICES | Facility: CLINIC | Age: 75
End: 2025-04-18
Payer: MEDICARE

## 2025-04-18 VITALS
HEIGHT: 68 IN | OXYGEN SATURATION: 99 % | WEIGHT: 189 LBS | HEART RATE: 70 BPM | SYSTOLIC BLOOD PRESSURE: 136 MMHG | BODY MASS INDEX: 28.64 KG/M2 | DIASTOLIC BLOOD PRESSURE: 66 MMHG

## 2025-04-18 DIAGNOSIS — E66.3 OVERWEIGHT (BMI 25.0-29.9): ICD-10-CM

## 2025-04-18 DIAGNOSIS — E11.65 UNCONTROLLED TYPE 2 DIABETES MELLITUS WITH HYPERGLYCEMIA: ICD-10-CM

## 2025-04-18 DIAGNOSIS — E11.65 TYPE 2 DIABETES MELLITUS WITH HYPERGLYCEMIA, UNSPECIFIED WHETHER LONG TERM INSULIN USE: Primary | ICD-10-CM

## 2025-04-18 LAB
EXPIRATION DATE: ABNORMAL
HBA1C MFR BLD: 7.2 % (ref 4.5–5.7)
Lab: ABNORMAL

## 2025-04-18 PROCEDURE — 99213 OFFICE O/P EST LOW 20 MIN: CPT | Performed by: NURSE PRACTITIONER

## 2025-04-18 PROCEDURE — 3078F DIAST BP <80 MM HG: CPT | Performed by: NURSE PRACTITIONER

## 2025-04-18 PROCEDURE — 1159F MED LIST DOCD IN RCRD: CPT | Performed by: NURSE PRACTITIONER

## 2025-04-18 PROCEDURE — 1160F RVW MEDS BY RX/DR IN RCRD: CPT | Performed by: NURSE PRACTITIONER

## 2025-04-18 PROCEDURE — 3051F HG A1C>EQUAL 7.0%<8.0%: CPT | Performed by: NURSE PRACTITIONER

## 2025-04-18 PROCEDURE — 3075F SYST BP GE 130 - 139MM HG: CPT | Performed by: NURSE PRACTITIONER

## 2025-06-16 ENCOUNTER — LAB (OUTPATIENT)
Dept: LAB | Facility: HOSPITAL | Age: 75
End: 2025-06-16
Payer: MEDICARE

## 2025-06-16 ENCOUNTER — TRANSCRIBE ORDERS (OUTPATIENT)
Dept: ADMINISTRATIVE | Facility: HOSPITAL | Age: 75
End: 2025-06-16
Payer: MEDICARE

## 2025-06-16 DIAGNOSIS — Z79.899 ENCOUNTER FOR LONG-TERM (CURRENT) USE OF MEDICATIONS: ICD-10-CM

## 2025-06-16 DIAGNOSIS — E55.9 VITAMIN D DEFICIENCY: ICD-10-CM

## 2025-06-16 DIAGNOSIS — E11.9 DIABETES MELLITUS WITHOUT COMPLICATION: ICD-10-CM

## 2025-06-16 DIAGNOSIS — Z12.5 SPECIAL SCREENING FOR MALIGNANT NEOPLASM OF PROSTATE: ICD-10-CM

## 2025-06-16 DIAGNOSIS — I10 HYPERTENSION, ESSENTIAL: ICD-10-CM

## 2025-06-16 DIAGNOSIS — I25.10 DISEASE OF CARDIOVASCULAR SYSTEM: ICD-10-CM

## 2025-06-16 DIAGNOSIS — K21.9 GERD WITHOUT ESOPHAGITIS: ICD-10-CM

## 2025-06-16 DIAGNOSIS — R79.89 ELEVATED LIVER FUNCTION TESTS: ICD-10-CM

## 2025-06-16 DIAGNOSIS — E78.5 HYPERLIPIDEMIA, UNSPECIFIED HYPERLIPIDEMIA TYPE: ICD-10-CM

## 2025-06-16 DIAGNOSIS — R53.83 FATIGUE, UNSPECIFIED TYPE: ICD-10-CM

## 2025-06-16 DIAGNOSIS — E78.5 HYPERLIPIDEMIA, UNSPECIFIED HYPERLIPIDEMIA TYPE: Primary | ICD-10-CM

## 2025-06-16 LAB
25(OH)D3 SERPL-MCNC: 36.8 NG/ML (ref 30–100)
ALBUMIN SERPL-MCNC: 4 G/DL (ref 3.5–5.2)
ALBUMIN UR-MCNC: <1.2 MG/DL
ALBUMIN/GLOB SERPL: 1.4 G/DL
ALP SERPL-CCNC: 69 U/L (ref 39–117)
ALT SERPL W P-5'-P-CCNC: 19 U/L (ref 1–41)
ANION GAP SERPL CALCULATED.3IONS-SCNC: 9.3 MMOL/L (ref 5–15)
AST SERPL-CCNC: 21 U/L (ref 1–40)
BASOPHILS # BLD AUTO: 0.03 10*3/MM3 (ref 0–0.2)
BASOPHILS NFR BLD AUTO: 0.6 % (ref 0–1.5)
BILIRUB SERPL-MCNC: 0.4 MG/DL (ref 0–1.2)
BUN SERPL-MCNC: 14 MG/DL (ref 8–23)
BUN/CREAT SERPL: 10.5 (ref 7–25)
CALCIUM SPEC-SCNC: 9.2 MG/DL (ref 8.6–10.5)
CHLORIDE SERPL-SCNC: 102 MMOL/L (ref 98–107)
CHOLEST SERPL-MCNC: 132 MG/DL (ref 0–200)
CO2 SERPL-SCNC: 25.7 MMOL/L (ref 22–29)
CREAT SERPL-MCNC: 1.33 MG/DL (ref 0.76–1.27)
CREAT UR-MCNC: 110.1 MG/DL
DEPRECATED RDW RBC AUTO: 44.9 FL (ref 37–54)
EGFRCR SERPLBLD CKD-EPI 2021: 55.7 ML/MIN/1.73
EOSINOPHIL # BLD AUTO: 0.33 10*3/MM3 (ref 0–0.4)
EOSINOPHIL NFR BLD AUTO: 6.1 % (ref 0.3–6.2)
ERYTHROCYTE [DISTWIDTH] IN BLOOD BY AUTOMATED COUNT: 12.7 % (ref 12.3–15.4)
GLOBULIN UR ELPH-MCNC: 2.9 GM/DL
GLUCOSE SERPL-MCNC: 284 MG/DL (ref 65–99)
HBA1C MFR BLD: 7.3 % (ref 4.8–5.6)
HCT VFR BLD AUTO: 40.3 % (ref 37.5–51)
HDLC SERPL-MCNC: 35 MG/DL (ref 40–60)
HGB BLD-MCNC: 13.2 G/DL (ref 13–17.7)
IMM GRANULOCYTES # BLD AUTO: 0.01 10*3/MM3 (ref 0–0.05)
IMM GRANULOCYTES NFR BLD AUTO: 0.2 % (ref 0–0.5)
LDLC SERPL CALC-MCNC: 73 MG/DL (ref 0–100)
LDLC/HDLC SERPL: 2.01 {RATIO}
LYMPHOCYTES # BLD AUTO: 1.42 10*3/MM3 (ref 0.7–3.1)
LYMPHOCYTES NFR BLD AUTO: 26.3 % (ref 19.6–45.3)
MCH RBC QN AUTO: 31 PG (ref 26.6–33)
MCHC RBC AUTO-ENTMCNC: 32.8 G/DL (ref 31.5–35.7)
MCV RBC AUTO: 94.6 FL (ref 79–97)
MICROALBUMIN/CREAT UR: NORMAL MG/G{CREAT}
MONOCYTES # BLD AUTO: 0.35 10*3/MM3 (ref 0.1–0.9)
MONOCYTES NFR BLD AUTO: 6.5 % (ref 5–12)
NEUTROPHILS NFR BLD AUTO: 3.25 10*3/MM3 (ref 1.7–7)
NEUTROPHILS NFR BLD AUTO: 60.3 % (ref 42.7–76)
PLATELET # BLD AUTO: 215 10*3/MM3 (ref 140–450)
PMV BLD AUTO: 10.6 FL (ref 6–12)
POTASSIUM SERPL-SCNC: 4.4 MMOL/L (ref 3.5–5.2)
PROT SERPL-MCNC: 6.9 G/DL (ref 6–8.5)
PSA SERPL-MCNC: 0.82 NG/ML (ref 0–4)
RBC # BLD AUTO: 4.26 10*6/MM3 (ref 4.14–5.8)
SODIUM SERPL-SCNC: 137 MMOL/L (ref 136–145)
TRIGL SERPL-MCNC: 134 MG/DL (ref 0–150)
TSH SERPL DL<=0.05 MIU/L-ACNC: 1.06 UIU/ML (ref 0.27–4.2)
VIT B12 BLD-MCNC: >2000 PG/ML (ref 211–946)
VLDLC SERPL-MCNC: 24 MG/DL (ref 5–40)
WBC NRBC COR # BLD AUTO: 5.39 10*3/MM3 (ref 3.4–10.8)

## 2025-06-16 PROCEDURE — 82043 UR ALBUMIN QUANTITATIVE: CPT

## 2025-06-16 PROCEDURE — G0103 PSA SCREENING: HCPCS

## 2025-06-16 PROCEDURE — 80053 COMPREHEN METABOLIC PANEL: CPT

## 2025-06-16 PROCEDURE — 85025 COMPLETE CBC W/AUTO DIFF WBC: CPT

## 2025-06-16 PROCEDURE — 82306 VITAMIN D 25 HYDROXY: CPT

## 2025-06-16 PROCEDURE — 36415 COLL VENOUS BLD VENIPUNCTURE: CPT

## 2025-06-16 PROCEDURE — 83036 HEMOGLOBIN GLYCOSYLATED A1C: CPT

## 2025-06-16 PROCEDURE — 82607 VITAMIN B-12: CPT

## 2025-06-16 PROCEDURE — 82570 ASSAY OF URINE CREATININE: CPT

## 2025-06-16 PROCEDURE — 84443 ASSAY THYROID STIM HORMONE: CPT

## 2025-06-16 PROCEDURE — 80061 LIPID PANEL: CPT

## 2025-06-30 NOTE — PROGRESS NOTES
Subjective:     Encounter Date:07/01/2025      Patient ID: Nhan James is a 75 y.o. male.    Chief Complaint:  History of Present Illness    This is a 75-year-old with hypertension, acid reflux, coronary artery disease status post drug-eluting stent placement of the right coronary artery in 5/2022, hyperlipidemia with statin intolerance, who presents for follow-up.     I saw the patient last in 6/2024 at which time he appeared to be fairly stable from a cardiac standpoint and no changes were made.  He saw Heidi Lennox, APRN in 12/2020 for at which time he was doing well without any issues.  He was unable to afford Repatha and was back on ezetimibe.      He presents back today for follow-up.  He is tolerating the 5 mg of ezetimibe.  His most recent lipid panel showed his LDL was down to 73.  He denies any chest pain, shortness of breath, palpitations, orthopnea, near-syncope or syncope or lower extremity swelling.    Prior History:  I saw the patient in the past in 10/2015 for complaints of near syncope.  He had undergone an echocardiogram prior to that office visit that was unremarkable.  I recommended proceeding with a stress test because of symptoms as diaphoresis associated with his near-syncope.  Testing was never completed.      I did not see him again until 5/2022 when he presented with complaints of chest pain.  He reported an episode 4 days prior of midsternal chest pain that radiated across his chest to his bilateral upper arms and was associated with bilateral arm numbness.  He denied any associated nausea, vomiting, diaphoresis, or shortness of breath.  As soon as the symptoms started he turned right back around and went back to his house.  Once he got home he took a aspirin and sat down.  After resting for a few minutes the symptoms resolved.  He did report a significant family history of coronary artery disease including his father who had his first myocardial infarction at the age of 53 and his  brother who had a CABG at the age of 52.     Based on his symptoms concerning for unstable angina I recommended proceeding directly with a cardiac catheterization.  This was performed on 5/18/2022.  Coronary angiograms revealed 50% stenosis of his mid left circumflex artery, mild nonobstructive disease of the LAD, 80 and 95% serial mid to distal right coronary artery stenosis status post drug-eluting stent placement with a sign Skypoint 3.0 x 38 mm stent (postdilated with 3.5 mm noncompliant balloon), and a 70 to 80% stenosis of a small caliber mid posterior descending branch.     In follow-up he was seen by PRITI Rivas on 6/3/2022 at which time he denies any further symptoms.  He was started on Repatha due to history of statin intolerance.  He was then seen back in follow-up by PRITI Rivas on 7/28/2022 at which time he continued to be doing well however he was unable to afford the Repatha.  At the time he was switched to ezetimibe.       In 6/2023 the patient reported an episode of atypical pain that sounded musculoskeletal.  He indicated his preference to stop the clopidogrel.  After long discussion we decided to stop it.    Review of Systems   Constitutional: Negative for malaise/fatigue.   HENT:  Negative for hearing loss, hoarse voice, nosebleeds and sore throat.    Eyes:  Negative for pain.   Cardiovascular:  Negative for chest pain, claudication, cyanosis, dyspnea on exertion, irregular heartbeat, leg swelling, near-syncope, orthopnea, palpitations, paroxysmal nocturnal dyspnea and syncope.   Respiratory:  Negative for shortness of breath and snoring.    Endocrine: Negative for cold intolerance, heat intolerance, polydipsia, polyphagia and polyuria.   Skin:  Negative for itching and rash.   Musculoskeletal:  Negative for arthritis, falls, joint pain, joint swelling, muscle cramps, muscle weakness and myalgias.   Gastrointestinal:  Negative for constipation, diarrhea, dysphagia, heartburn,  hematemesis, hematochezia, melena, nausea and vomiting.   Genitourinary:  Negative for frequency, hematuria and hesitancy.   Neurological:  Negative for excessive daytime sleepiness, dizziness, headaches, light-headedness, numbness and weakness.   Psychiatric/Behavioral:  Negative for depression. The patient is not nervous/anxious.          Current Outpatient Medications:     ALPRAZolam (XANAX) 1 MG tablet, Take 0.5 tablets by mouth every night at bedtime., Disp: , Rfl:     aspirin 81 MG chewable tablet, CHEW AND SWALLOW 1 TABLET BY MOUTH DAILY, Disp: 30 tablet, Rfl: 11    Barberry-Oreg Grape-Goldenseal (BERBERINE COMPLEX PO), Take  by mouth., Disp: , Rfl:     Biotin 10 MG capsule, Take  by mouth., Disp: , Rfl:     Blood Glucose Monitoring Suppl device, Use as directed for blood glucose monitoring, Disp: 1 each, Rfl: 0    coenzyme Q10 100 MG capsule, Take 1 capsule by mouth Daily., Disp: , Rfl:     ezetimibe (Zetia) 10 MG tablet, Take 0.5 tablets by mouth Daily., Disp: 30 tablet, Rfl: 5    glucose blood test strip, Test blood glucose 1 times each day, Disp: 100 each, Rfl: 5    L-ARGININE PO, Take  by mouth., Disp: , Rfl:     Lancets misc, Test blood glucose 1 times each day, Disp: 100 each, Rfl: 5    lisinopril (PRINIVIL,ZESTRIL) 10 MG tablet, TAKE 1/2 TO 1 (ONE-HALF TO ONE) TABLET BY MOUTH ONCE DAILY FOR BLOOD PRESSURE GREATER THAN 140, Disp: , Rfl:     Magnesium Gluconate (MAGNESIUM 27 PO), Take  by mouth., Disp: , Rfl:     metFORMIN ER (GLUCOPHAGE-XR) 500 MG 24 hr tablet, Take 1 tablet by mouth Daily With Breakfast., Disp: , Rfl:     nitroglycerin (NITROSTAT) 0.4 MG SL tablet, Place 1 tablet under the tongue Every 5 (Five) Minutes As Needed for Chest Pain (Only if SBP Greater Than 100). Take no more than 3 doses in 15 minutes., Disp: 25 tablet, Rfl: 3    Omega-3 Fatty Acids (OMEGA-3 2100 PO), Take  by mouth., Disp: , Rfl:     omeprazole (priLOSEC) 40 MG capsule, TAKE 1 CAPSULE BY MOUTH EVERY DAY ON AN EMPTY  STOMACH FOR STOMACH ACID, Disp: , Rfl:     ZINC PICOLINATE PO, Take  by mouth., Disp: , Rfl:     Past Medical History:   Diagnosis Date    CAD (coronary artery disease)     Diabetes mellitus 05/21    History of hepatitis     Hyperlipidemia     Hypertension     IVAN (obstructive sleep apnea)     PONV (postoperative nausea and vomiting)        Past Surgical History:   Procedure Laterality Date    APPENDECTOMY      CARDIAC CATHETERIZATION N/A 5/18/2022    Procedure: Coronary angiography;  Surgeon: Marilyn Gray MD;  Location:  [facility];  Service: Cardiovascular;  Laterality: N/A;    CARDIAC CATHETERIZATION N/A 5/18/2022    Procedure: Left heart cath;  Surgeon: Marilyn Gray MD;  Location:  [facility];  Service: Cardiovascular;  Laterality: N/A;    CARDIAC CATHETERIZATION N/A 5/18/2022    Procedure: Left ventriculography;  Surgeon: Marilyn Gray MD;  Location:  [facility];  Service: Cardiovascular;  Laterality: N/A;    CARDIAC CATHETERIZATION N/A 5/18/2022    Procedure: Stent KEMAL coronary;  Surgeon: Marilyn Gray MD;  Location:  [facility];  Service: Cardiovascular;  Laterality: N/A;    CHOLECYSTECTOMY      FINGER SURGERY      TONSILLECTOMY AND ADENOIDECTOMY         Family History   Problem Relation Age of Onset    Heart disease Mother     Diabetes Mother     Diabetes Father     Hypertension Father     Heart disease Father     Diabetes Brother     Hypertension Brother     Heart disease Brother     Hypertension Brother     Heart disease Brother     Heart disease Brother     Diabetes Maternal Grandmother     Hypertension Maternal Grandfather     Stroke Maternal Grandfather     Cerebral aneurysm Paternal Aunt     Cancer Paternal Aunt     Cancer Paternal Uncle     Heart disease Brother        Social History     Tobacco Use    Smoking status: Former     Current packs/day: 0.00     Types: Cigarettes     Quit date: 2002     Years since  "quittin.5    Smokeless tobacco: Never    Tobacco comments:     caffeine use   Vaping Use    Vaping status: Never Used   Substance Use Topics    Alcohol use: Yes     Comment: occ    Drug use: Never         ECG 12 Lead    Date/Time: 2025 12:52 PM  Performed by: Marilyn Gray MD    Authorized by: Marilyn Gray MD  Comparison: compared with previous ECG   Similar to previous ECG  Rhythm: sinus rhythm             Objective:     Visit Vitals  /80   Pulse 59   Ht 172.7 cm (68\")   Wt 85.7 kg (189 lb)   BMI 28.74 kg/m²         Constitutional:       Appearance: Normal appearance. Well-developed.   HENT:      Head: Normocephalic and atraumatic.   Neck:      Vascular: No carotid bruit or JVD.   Pulmonary:      Effort: Pulmonary effort is normal.      Breath sounds: Normal breath sounds.   Cardiovascular:      Normal rate. Regular rhythm.      No gallop.    Pulses:     Radial: 2+ bilaterally.  Edema:     Peripheral edema absent.   Abdominal:      Palpations: Abdomen is soft.   Skin:     General: Skin is warm and dry.   Neurological:      Mental Status: Alert and oriented to person, place, and time.             Assessment:          Diagnosis Plan   1. Coronary artery disease involving native coronary artery of native heart without angina pectoris        2. S/P right coronary artery (RCA) stent placement        3. Primary hypertension        4. Hyperlipidemia LDL goal <70        5. IVAN on CPAP        6. Type 2 diabetes mellitus with other circulatory complication, without long-term current use of insulin               Plan:       1.  Coronary artery disease.  Stable and asymptomatic.  EKG without any changes.  Continue current medical management.  2.  Hypertension.  Well-controlled on current regimen medications.  Continue same.  3.  Hyperlipidemia.  Statin intolerant.  Unable to afford PCSK9 inhibitor.  On low-dose ezetimibe with an LDL close to goal at 73.  Continue current management.  4.  Obstructive sleep " apnea.  On CPAP.  5.  Diabetes mellitus type 2.    Will plan on seeing the patient back again in 6 months.

## 2025-07-01 ENCOUNTER — OFFICE VISIT (OUTPATIENT)
Age: 75
End: 2025-07-01
Payer: MEDICARE

## 2025-07-01 VITALS
SYSTOLIC BLOOD PRESSURE: 138 MMHG | BODY MASS INDEX: 28.64 KG/M2 | WEIGHT: 189 LBS | DIASTOLIC BLOOD PRESSURE: 80 MMHG | HEIGHT: 68 IN | HEART RATE: 59 BPM

## 2025-07-01 DIAGNOSIS — E78.5 HYPERLIPIDEMIA LDL GOAL <70: ICD-10-CM

## 2025-07-01 DIAGNOSIS — G47.33 OSA ON CPAP: ICD-10-CM

## 2025-07-01 DIAGNOSIS — E11.59 TYPE 2 DIABETES MELLITUS WITH OTHER CIRCULATORY COMPLICATION, WITHOUT LONG-TERM CURRENT USE OF INSULIN: ICD-10-CM

## 2025-07-01 DIAGNOSIS — I25.10 CORONARY ARTERY DISEASE INVOLVING NATIVE CORONARY ARTERY OF NATIVE HEART WITHOUT ANGINA PECTORIS: Primary | ICD-10-CM

## 2025-07-01 DIAGNOSIS — I10 PRIMARY HYPERTENSION: ICD-10-CM

## 2025-07-01 DIAGNOSIS — Z95.5 S/P RIGHT CORONARY ARTERY (RCA) STENT PLACEMENT: ICD-10-CM

## 2025-07-01 PROCEDURE — 99214 OFFICE O/P EST MOD 30 MIN: CPT | Performed by: INTERNAL MEDICINE

## 2025-07-01 PROCEDURE — 1160F RVW MEDS BY RX/DR IN RCRD: CPT | Performed by: INTERNAL MEDICINE

## 2025-07-01 PROCEDURE — 3075F SYST BP GE 130 - 139MM HG: CPT | Performed by: INTERNAL MEDICINE

## 2025-07-01 PROCEDURE — 93000 ELECTROCARDIOGRAM COMPLETE: CPT | Performed by: INTERNAL MEDICINE

## 2025-07-01 PROCEDURE — 3079F DIAST BP 80-89 MM HG: CPT | Performed by: INTERNAL MEDICINE

## 2025-07-01 PROCEDURE — 1159F MED LIST DOCD IN RCRD: CPT | Performed by: INTERNAL MEDICINE

## 2025-07-01 RX ORDER — EZETIMIBE 10 MG/1
5 TABLET ORAL DAILY
Qty: 45 TABLET | Refills: 3 | Status: SHIPPED | OUTPATIENT
Start: 2025-07-01

## 2025-07-07 ENCOUNTER — HOSPITAL ENCOUNTER (OUTPATIENT)
Dept: ULTRASOUND IMAGING | Facility: HOSPITAL | Age: 75
Discharge: HOME | End: 2025-07-07
Admitting: FAMILY MEDICINE
Payer: MEDICARE

## 2025-07-07 DIAGNOSIS — R79.89 ELEVATED LIVER FUNCTION TESTS: ICD-10-CM

## 2025-07-07 PROCEDURE — 76775 US EXAM ABDO BACK WALL LIM: CPT

## 2025-08-01 ENCOUNTER — OFFICE VISIT (OUTPATIENT)
Dept: DIABETES SERVICES | Facility: CLINIC | Age: 75
End: 2025-08-01
Payer: MEDICARE

## 2025-08-01 VITALS
SYSTOLIC BLOOD PRESSURE: 132 MMHG | OXYGEN SATURATION: 97 % | WEIGHT: 187 LBS | HEIGHT: 68 IN | DIASTOLIC BLOOD PRESSURE: 60 MMHG | BODY MASS INDEX: 28.34 KG/M2 | HEART RATE: 71 BPM

## 2025-08-01 DIAGNOSIS — E11.9 TYPE 2 DIABETES MELLITUS WITH HEMOGLOBIN A1C GOAL OF LESS THAN 7.0%: ICD-10-CM

## 2025-08-01 DIAGNOSIS — E11.65 TYPE 2 DIABETES MELLITUS WITH HYPERGLYCEMIA, UNSPECIFIED WHETHER LONG TERM INSULIN USE: Primary | ICD-10-CM

## 2025-08-01 DIAGNOSIS — E66.3 OVERWEIGHT (BMI 25.0-29.9): ICD-10-CM

## 2025-08-01 DIAGNOSIS — E11.22 TYPE 2 DIABETES MELLITUS WITH STAGE 3A CHRONIC KIDNEY DISEASE, WITHOUT LONG-TERM CURRENT USE OF INSULIN: ICD-10-CM

## 2025-08-01 DIAGNOSIS — N18.31 TYPE 2 DIABETES MELLITUS WITH STAGE 3A CHRONIC KIDNEY DISEASE, WITHOUT LONG-TERM CURRENT USE OF INSULIN: ICD-10-CM

## 2025-08-01 LAB
EXPIRATION DATE: ABNORMAL
HBA1C MFR BLD: 6.6 % (ref 4.5–5.7)
Lab: ABNORMAL

## 2025-08-01 RX ORDER — AVOBENZONE, HOMOSALATE, OCTISALATE, OCTOCRYLENE 30; 40; 45; 26 MG/ML; MG/ML; MG/ML; MG/ML
CREAM TOPICAL
Qty: 100 EACH | Refills: 5 | Status: SHIPPED | OUTPATIENT
Start: 2025-08-01

## 2025-08-01 RX ORDER — METFORMIN HYDROCHLORIDE 500 MG/1
500 TABLET, EXTENDED RELEASE ORAL
Qty: 90 TABLET | Refills: 1 | Status: SHIPPED | OUTPATIENT
Start: 2025-08-01 | End: 2025-10-30

## 2025-08-01 NOTE — PROGRESS NOTES
Chief Complaint  Diabetes (Follow up, med mgt, A1c eval)    Referred By: Bunny Hickey Jr., MD    Patient or patient representative verbalized consent for the use of Ambient Listening during the visit with  PRITI Horn for chart documentation. 8/1/2025  10:34 EDT    Subjective          Nhan James presents to [facility] for diabetes medication management    History of Present Illness    History of Present Illness  The patient presents for evaluation of diabetes mellitus.    He reports satisfactory blood sugar levels, with readings typically in the 80s post-lunch, although they can rise to around 140 depending on his diet. He does not frequently monitor his morning blood sugar levels. He has not experienced any hypoglycemic episodes or symptoms such as excessive thirst or urination. His appetite remains normal, and he has not noticed any instances of blurred vision or fatigue. He also reports no new health issues.    He maintains an active lifestyle, engaging in physical activities such as golf and gym workouts 4 to 5 days a week, which include leg presses, free weights, bench press, pull downs, and pull-ups for 45 minutes to an hour. He is currently on a daily dose of metformin 500 mg, along with cinnamon and berberine supplements. He occasionally takes omega-3 supplements twice a week and CoQ10 daily.    He mentions that his creatinine levels were elevated during his last kidney test, which he attributes to inadequate water intake. He has since increased his water consumption. A follow-up test is scheduled for 09/01/2025.    Hobbies: Golf, fishing         Visit type:  follow-up  Diabetes type:  Type 2  Current diabetes status/concerns/issues reports he normally checks his blood sugars after lunch and they are in the 80s.  States he is working out 4 to 5 days a week and golfing.  Other health concerns: No new health concerns  Current Diabetes symptoms:    Polyuria:  No   Polydipsia: No   Polyphagia: No   Blurred vision: No   Excessive fatigue: No  Known Diabetes complications:  Neuropathy: None; Location: N/A  Renal: Stage IIIa moderate (GFR = 45-59 mL/min  Eyes: None; Location: N/A-appt next wk with   Amputation/Wounds: None  GI: Reflux  Cardiovascular: Hypertension, Hyperlipidemia, Hypercholesterolemia, Hypertriglyceridemia, CAD and MI (History of)  ED: None  Other: None  Hypoglycemia:  None reported at this time  Hypoglycemia Symptoms:  No hypoglycemia at this time  Current diabetes treatment:  Cinnamon, berberine , metformin xr 500mg qd   Blood glucose device:  Meter  Blood glucose monitoring frequency:  1  Blood glucose range/average:  80-140mg/dl after lunch  Glucose Source: Patient Reported  Dietary behavior:  Number of meals each day - 3; Number of snacks each day - 1, History of Diabetes Nutrition Counseling - YES, Reports he is eating high carbs but decreasing his sugar content. He has reduced his sodas to once per wk insted of 2-3 daily. He also cut sweet snack cakes  Activity/Exercise: he plays golf and goes to the gym 4-5  times wk.    Past Medical History:   Diagnosis Date    CAD (coronary artery disease)     Diabetes mellitus 05/21    History of hepatitis     Hyperlipidemia     Hypertension     IVAN (obstructive sleep apnea)     PONV (postoperative nausea and vomiting)      Past Surgical History:   Procedure Laterality Date    APPENDECTOMY      CARDIAC CATHETERIZATION N/A 5/18/2022    Procedure: Coronary angiography;  Surgeon: Marilyn Gray MD;  Location: [facility] INV[facility] LOCATION;  Service: Cardiovascular;  Laterality: N/A;    CARDIAC CATHETERIZATION N/A 5/18/2022    Procedure: Left heart cath;  Surgeon: Marilyn Gray MD;  Location: [facility] INVAS[facility] LOCATION;  Service: Cardiovascular;  Laterality: N/A;    CARDIAC CATHETERIZATION N/A 5/18/2022    Procedure: Left ventriculography;  Surgeon: Marilyn Gray MD;  Location: [facility]  INVASIVE LOCATION;  Service: Cardiovascular;  Laterality: N/A;    CARDIAC CATHETERIZATION N/A 2022    Procedure: Stent KEMAL coronary;  Surgeon: Marilyn Gray MD;  Location:  [facility];  Service: Cardiovascular;  Laterality: N/A;    CHOLECYSTECTOMY      FINGER SURGERY      TONSILLECTOMY AND ADENOIDECTOMY       Family History   Problem Relation Age of Onset    Heart disease Mother     Diabetes Mother     Diabetes Father     Hypertension Father     Heart disease Father     Diabetes Brother     Hypertension Brother     Heart disease Brother     Hypertension Brother     Heart disease Brother     Heart disease Brother     Diabetes Maternal Grandmother     Hypertension Maternal Grandfather     Stroke Maternal Grandfather     Cerebral aneurysm Paternal Aunt     Cancer Paternal Aunt     Cancer Paternal Uncle     Heart disease Brother      Social History     Socioeconomic History    Marital status:     Number of children: 2   Tobacco Use    Smoking status: Former     Current packs/day: 0.00     Types: Cigarettes     Quit date:      Years since quittin.    Smokeless tobacco: Never    Tobacco comments:     caffeine use   Vaping Use    Vaping status: Never Used   Substance and Sexual Activity    Alcohol use: Yes     Comment: occ    Drug use: Never    Sexual activity: Defer     No Known Allergies    Current Outpatient Medications:     ALPRAZolam (XANAX) 1 MG tablet, Take 0.5 tablets by mouth every night at bedtime., Disp: , Rfl:     aspirin 81 MG chewable tablet, CHEW AND SWALLOW 1 TABLET BY MOUTH DAILY, Disp: 30 tablet, Rfl: 11    Barberry-Oreg Grape-Goldenseal (BERBERINE COMPLEX PO), Take  by mouth., Disp: , Rfl:     Biotin 10 MG capsule, Take  by mouth., Disp: , Rfl:     Blood Glucose Monitoring Suppl device, Use as directed for blood glucose monitoring, Disp: 1 each, Rfl: 0    coenzyme Q10 100 MG capsule, Take 1 capsule by mouth Daily., Disp: , Rfl:     ezetimibe (Zetia) 10 MG  "tablet, Take 0.5 tablets by mouth Daily., Disp: 45 tablet, Rfl: 3    glucose blood test strip, Test blood glucose 1 times each day, Disp: 100 each, Rfl: 5    L-ARGININE PO, Take  by mouth., Disp: , Rfl:     Lancets misc, Test blood glucose 1 times each day, Disp: 100 each, Rfl: 5    lisinopril (PRINIVIL,ZESTRIL) 10 MG tablet, TAKE 1/2 TO 1 (ONE-HALF TO ONE) TABLET BY MOUTH ONCE DAILY FOR BLOOD PRESSURE GREATER THAN 140, Disp: , Rfl:     Magnesium Gluconate (MAGNESIUM 27 PO), Take  by mouth., Disp: , Rfl:     metFORMIN ER (GLUCOPHAGE-XR) 500 MG 24 hr tablet, Take 1 tablet by mouth Daily With Breakfast for 90 days., Disp: 90 tablet, Rfl: 1    nitroglycerin (NITROSTAT) 0.4 MG SL tablet, Place 1 tablet under the tongue Every 5 (Five) Minutes As Needed for Chest Pain (Only if SBP Greater Than 100). Take no more than 3 doses in 15 minutes., Disp: 25 tablet, Rfl: 3    omeprazole (priLOSEC) 40 MG capsule, TAKE 1 CAPSULE BY MOUTH EVERY DAY ON AN EMPTY STOMACH FOR STOMACH ACID, Disp: , Rfl:     ZINC PICOLINATE PO, Take  by mouth., Disp: , Rfl:     Omega-3 Fatty Acids (OMEGA-3 2100 PO), Take  by mouth. (Patient not taking: Reported on 8/1/2025), Disp: , Rfl:     Objective     Vitals:    08/01/25 1005   BP: 132/60   BP Location: Left arm   Patient Position: Sitting   Cuff Size: Large Adult   Pulse: 71   SpO2: 97%   Weight: 84.8 kg (187 lb)   Height: 172.7 cm (68\")   PainSc: 0-No pain     Body mass index is 28.43 kg/m².    Physical Exam  Constitutional:       Appearance: Normal appearance. He is normal weight.      Comments: Overweight (BMI 25 - 29.9) Pt Current BMI = 28.43     HENT:      Head: Normocephalic and atraumatic.      Right Ear: External ear normal.      Left Ear: External ear normal.      Nose: Nose normal.   Eyes:      Extraocular Movements: Extraocular movements intact.      Conjunctiva/sclera: Conjunctivae normal.   Pulmonary:      Effort: Pulmonary effort is normal.   Musculoskeletal:         General: Normal " range of motion.      Cervical back: Normal range of motion.   Skin:     General: Skin is warm and dry.   Neurological:      General: No focal deficit present.      Mental Status: He is alert and oriented to person, place, and time. Mental status is at baseline.   Psychiatric:         Mood and Affect: Mood normal.         Behavior: Behavior normal.         Thought Content: Thought content normal.         Judgment: Judgment normal.             Result Review :   The following data was reviewed by: PRITI Horn on 08/01/2025:    Most Recent A1C          8/1/2025    10:17   HGBA1C Most Recent   Hemoglobin A1C 6.6        A1C Last 3 Results          4/18/2025    10:34 6/16/2025    08:55 8/1/2025    10:17   HGBA1C Last 3 Results   Hemoglobin A1C 7.2  7.30  6.6      A1c collected in the office today is 6.6%, indicating Controlled Type II diabetes.  This result is up from the prior result of 7.3% collected on 6/16/25.         Creatinine   Date Value Ref Range Status   06/16/2025 1.33 (H) 0.76 - 1.27 mg/dL Final   06/03/2024 1.14 0.76 - 1.27 mg/dL Final     eGFR   Date Value Ref Range Status   06/16/2025 55.7 (L) >60.0 mL/min/1.73 Final   06/03/2024 67.5 >60.0 mL/min/1.73 Final     Labs collected on 6/16/25 show Stage IIIa moderate (GFR = 45-59 mL/min    Microalbumin, Urine   Date Value Ref Range Status   06/16/2025 <1.2 mg/dL Final   06/03/2024 <1.2 mg/dL Final     Creatinine, Urine   Date Value Ref Range Status   06/16/2025 110.1 mg/dL Final   06/03/2024 70.6 mg/dL Final     Microalbumin/Creatinine Ratio   Date Value Ref Range Status   06/16/2025   Final     Comment:     Unable to calculate   06/03/2024   Final     Comment:     Unable to calculate     Urine microalbuminuria collected on 6/16/25 is negative for microalbuminuria    Total Cholesterol   Date Value Ref Range Status   06/16/2025 132 0 - 200 mg/dL Final   06/03/2024 157 0 - 200 mg/dL Final     Triglycerides   Date Value Ref Range Status   06/16/2025  134 0 - 150 mg/dL Final   06/03/2024 203 (H) 0 - 150 mg/dL Final     HDL Cholesterol   Date Value Ref Range Status   06/16/2025 35 (L) 40 - 60 mg/dL Final   06/03/2024 36 (L) 40 - 60 mg/dL Final     LDL Cholesterol    Date Value Ref Range Status   06/16/2025 73 0 - 100 mg/dL Final   06/03/2024 86 0 - 100 mg/dL Final     Lipid panel collected on 6/16/25 shows low HDL              Diagnoses and all orders for this visit:    1. Type 2 diabetes mellitus with hyperglycemia, unspecified whether long term insulin use (Primary)  -     POC Glycosylated Hemoglobin (Hb A1C)  -     glucose blood test strip; Test blood glucose 1 times each day  Dispense: 100 each; Refill: 5  -     Lancets misc; Test blood glucose 1 times each day  Dispense: 100 each; Refill: 5  -     metFORMIN ER (GLUCOPHAGE-XR) 500 MG 24 hr tablet; Take 1 tablet by mouth Daily With Breakfast for 90 days.  Dispense: 90 tablet; Refill: 1    2. Overweight (BMI 25.0-29.9)    3. Type 2 diabetes mellitus with stage 3a chronic kidney disease, without long-term current use of insulin    4. Type 2 diabetes mellitus with hemoglobin A1c goal of less than 7.0%          Assessment & Plan  1. Diabetes Mellitus.  - A1c levels have shown significant improvement, decreasing from 8.2 in 01/2025 to 6.6 in 08/2025.  - No symptoms of hypoglycemia, frequent thirst, urination, blurred vision, or fatigue reported.  - Weight has decreased from 189 to 187 pounds.  - Prescriptions for test strips and Lantus have been sent to pharmacy. A 90-day refill of metformin 500 mg once daily has also been provided and sent to pharmacy.    2. Elevated Creatinine.  - Creatinine levels were slightly elevated, potentially due to dehydration.  - GFR had also decreased slightly.  - Advised to stay hydrated.  - Follow-up tests for creatinine and GFR are scheduled for 09/01/2025.    3. Low HDL.  - HDL levels are low, which may be genetic.  - Advised to continue current exercise regimen, which includes  working out four to five days a week using various gym machines and free weights.  - Continues taking omega-3 occasionally and CoQ10 daily.    Follow-up: November 2025 at 9:00 AM.      The patient will monitor his blood glucose levels 1 time daily.  If he develops problematic hyperglycemia or hypoglycemia or adverse drug reactions, he will contact the office for further instructions.        Follow Up     Return in about 3 months (around 11/1/2025) for Medication Mgmt.    Patient was given instructions and counseling regarding his condition or for health maintenance advice. Please see specific information pulled into the AVS if appropriate.     Deborah Justin, PRITI  08/01/2025      Dictated Utilizing Dragon Dictation.  Please note that portions of this note were completed with a voice recognition program.  Part of this note may be an electronic transcription/translation of spoken language to printed text using the Dragon Dictation System.

## (undated) DEVICE — KT MANIFLD CARDIAC

## (undated) DEVICE — CATH DIAG IMPULSE FL3.5 5F 100CM

## (undated) DEVICE — GLIDESHEATH SLENDER STAINLESS STEEL KIT: Brand: GLIDESHEATH SLENDER

## (undated) DEVICE — GUIDELINER CATHETERS ARE INTENDED TO BE USED IN CONJUNCTION WITH GUIDE CATHETERS TO ACCESS DISCRETE REGIONS OF THE CORONARY AND/OR PERIPHERAL VASCULATURE, AND TO FACILITATE PLACEMENT OF INTERVENTIONAL DEVICES.: Brand: GUIDELINER® V3 CATHETER

## (undated) DEVICE — GW EMR FIX EXCHG J STD .035 3MM 260CM

## (undated) DEVICE — CATH DIAG IMPULSE FR4 5F 100CM

## (undated) DEVICE — 6F .070 JR 4 100CM: Brand: CORDIS

## (undated) DEVICE — DEV INDEFLATOR P/N 580289

## (undated) DEVICE — TREK CORONARY DILATATION CATHETER 3.0 MM X 15 MM / RAPID-EXCHANGE: Brand: TREK

## (undated) DEVICE — CATH VENT MIV RADL PIG ST TIP 5F 110CM

## (undated) DEVICE — PK CATH CARD 40

## (undated) DEVICE — RUNTHROUGH NS EXTRA FLOPPY PTCA GUIDEWIRE: Brand: RUNTHROUGH

## (undated) DEVICE — NC TREK CORONARY DILATATION CATHETER 3.5 MM X 20 MM / RAPID-EXCHANGE: Brand: NC TREK